# Patient Record
Sex: MALE | Race: WHITE | NOT HISPANIC OR LATINO | URBAN - METROPOLITAN AREA
[De-identification: names, ages, dates, MRNs, and addresses within clinical notes are randomized per-mention and may not be internally consistent; named-entity substitution may affect disease eponyms.]

---

## 2019-01-31 ENCOUNTER — OUTPATIENT (OUTPATIENT)
Dept: OUTPATIENT SERVICES | Facility: HOSPITAL | Age: 34
LOS: 1 days | Discharge: HOME | End: 2019-01-31

## 2019-01-31 DIAGNOSIS — E61.1 IRON DEFICIENCY: ICD-10-CM

## 2019-01-31 DIAGNOSIS — D50.0 IRON DEFICIENCY ANEMIA SECONDARY TO BLOOD LOSS (CHRONIC): ICD-10-CM

## 2019-01-31 DIAGNOSIS — D64.9 ANEMIA, UNSPECIFIED: ICD-10-CM

## 2019-01-31 DIAGNOSIS — E11.9 TYPE 2 DIABETES MELLITUS WITHOUT COMPLICATIONS: ICD-10-CM

## 2019-01-31 DIAGNOSIS — E78.00 PURE HYPERCHOLESTEROLEMIA, UNSPECIFIED: ICD-10-CM

## 2019-01-31 DIAGNOSIS — N18.2 CHRONIC KIDNEY DISEASE, STAGE 2 (MILD): ICD-10-CM

## 2019-01-31 DIAGNOSIS — E55.9 VITAMIN D DEFICIENCY, UNSPECIFIED: ICD-10-CM

## 2019-01-31 DIAGNOSIS — N39.0 URINARY TRACT INFECTION, SITE NOT SPECIFIED: ICD-10-CM

## 2019-01-31 DIAGNOSIS — K76.89 OTHER SPECIFIED DISEASES OF LIVER: ICD-10-CM

## 2019-02-27 ENCOUNTER — OUTPATIENT (OUTPATIENT)
Dept: OUTPATIENT SERVICES | Facility: HOSPITAL | Age: 34
LOS: 1 days | Discharge: HOME | End: 2019-02-27

## 2023-11-01 PROBLEM — Z00.00 ENCOUNTER FOR PREVENTIVE HEALTH EXAMINATION: Status: ACTIVE | Noted: 2023-11-01

## 2024-10-02 ENCOUNTER — NON-APPOINTMENT (OUTPATIENT)
Age: 39
End: 2024-10-02

## 2024-10-03 ENCOUNTER — INPATIENT (INPATIENT)
Facility: HOSPITAL | Age: 39
LOS: 5 days | Discharge: HOME | DRG: 392 | End: 2024-10-09
Attending: HOSPITALIST | Admitting: STUDENT IN AN ORGANIZED HEALTH CARE EDUCATION/TRAINING PROGRAM
Payer: COMMERCIAL

## 2024-10-03 VITALS
DIASTOLIC BLOOD PRESSURE: 88 MMHG | HEART RATE: 96 BPM | RESPIRATION RATE: 18 BRPM | OXYGEN SATURATION: 99 % | HEIGHT: 65 IN | WEIGHT: 160.06 LBS | SYSTOLIC BLOOD PRESSURE: 136 MMHG | TEMPERATURE: 98 F

## 2024-10-03 LAB
ALBUMIN SERPL ELPH-MCNC: 4.8 G/DL — SIGNIFICANT CHANGE UP (ref 3.5–5.2)
ALP SERPL-CCNC: 64 U/L — SIGNIFICANT CHANGE UP (ref 30–115)
ALT FLD-CCNC: 17 U/L — SIGNIFICANT CHANGE UP (ref 0–41)
ANION GAP SERPL CALC-SCNC: 13 MMOL/L — SIGNIFICANT CHANGE UP (ref 7–14)
APPEARANCE UR: CLEAR — SIGNIFICANT CHANGE UP
AST SERPL-CCNC: 16 U/L — SIGNIFICANT CHANGE UP (ref 0–41)
BACTERIA # UR AUTO: ABNORMAL /HPF
BASOPHILS # BLD AUTO: 0.02 K/UL — SIGNIFICANT CHANGE UP (ref 0–0.2)
BASOPHILS NFR BLD AUTO: 0.2 % — SIGNIFICANT CHANGE UP (ref 0–1)
BILIRUB SERPL-MCNC: 0.6 MG/DL — SIGNIFICANT CHANGE UP (ref 0.2–1.2)
BILIRUB UR-MCNC: NEGATIVE — SIGNIFICANT CHANGE UP
BUN SERPL-MCNC: 15 MG/DL — SIGNIFICANT CHANGE UP (ref 10–20)
CALCIUM SERPL-MCNC: 9.5 MG/DL — SIGNIFICANT CHANGE UP (ref 8.4–10.5)
CAST: SIGNIFICANT CHANGE UP /LPF
CHLORIDE SERPL-SCNC: 100 MMOL/L — SIGNIFICANT CHANGE UP (ref 98–110)
CO2 SERPL-SCNC: 24 MMOL/L — SIGNIFICANT CHANGE UP (ref 17–32)
COLOR SPEC: YELLOW — SIGNIFICANT CHANGE UP
CREAT SERPL-MCNC: 1 MG/DL — SIGNIFICANT CHANGE UP (ref 0.7–1.5)
DIFF PNL FLD: NEGATIVE — SIGNIFICANT CHANGE UP
EGFR: 98 ML/MIN/1.73M2 — SIGNIFICANT CHANGE UP
EOSINOPHIL # BLD AUTO: 0 K/UL — SIGNIFICANT CHANGE UP (ref 0–0.7)
EOSINOPHIL NFR BLD AUTO: 0 % — SIGNIFICANT CHANGE UP (ref 0–8)
GLUCOSE SERPL-MCNC: 122 MG/DL — HIGH (ref 70–99)
GLUCOSE UR QL: NEGATIVE MG/DL — SIGNIFICANT CHANGE UP
HCT VFR BLD CALC: 45 % — SIGNIFICANT CHANGE UP (ref 42–52)
HGB BLD-MCNC: 15.6 G/DL — SIGNIFICANT CHANGE UP (ref 14–18)
IMM GRANULOCYTES NFR BLD AUTO: 0.3 % — SIGNIFICANT CHANGE UP (ref 0.1–0.3)
KETONES UR-MCNC: 40 MG/DL
LACTATE SERPL-SCNC: 1.7 MMOL/L — SIGNIFICANT CHANGE UP (ref 0.7–2)
LEUKOCYTE ESTERASE UR-ACNC: NEGATIVE — SIGNIFICANT CHANGE UP
LIDOCAIN IGE QN: 21 U/L — SIGNIFICANT CHANGE UP (ref 7–60)
LYMPHOCYTES # BLD AUTO: 0.68 K/UL — LOW (ref 1.2–3.4)
LYMPHOCYTES # BLD AUTO: 6.9 % — LOW (ref 20.5–51.1)
MCHC RBC-ENTMCNC: 30.4 PG — SIGNIFICANT CHANGE UP (ref 27–31)
MCHC RBC-ENTMCNC: 34.7 G/DL — SIGNIFICANT CHANGE UP (ref 32–37)
MCV RBC AUTO: 87.5 FL — SIGNIFICANT CHANGE UP (ref 80–94)
MONOCYTES # BLD AUTO: 0.13 K/UL — SIGNIFICANT CHANGE UP (ref 0.1–0.6)
MONOCYTES NFR BLD AUTO: 1.3 % — LOW (ref 1.7–9.3)
NEUTROPHILS # BLD AUTO: 8.94 K/UL — HIGH (ref 1.4–6.5)
NEUTROPHILS NFR BLD AUTO: 91.3 % — HIGH (ref 42.2–75.2)
NITRITE UR-MCNC: NEGATIVE — SIGNIFICANT CHANGE UP
NRBC # BLD: 0 /100 WBCS — SIGNIFICANT CHANGE UP (ref 0–0)
PH UR: 6.5 — SIGNIFICANT CHANGE UP (ref 5–8)
PLATELET # BLD AUTO: 277 K/UL — SIGNIFICANT CHANGE UP (ref 130–400)
PMV BLD: 10.4 FL — SIGNIFICANT CHANGE UP (ref 7.4–10.4)
POTASSIUM SERPL-MCNC: 4.5 MMOL/L — SIGNIFICANT CHANGE UP (ref 3.5–5)
POTASSIUM SERPL-SCNC: 4.5 MMOL/L — SIGNIFICANT CHANGE UP (ref 3.5–5)
PROT SERPL-MCNC: 7.1 G/DL — SIGNIFICANT CHANGE UP (ref 6–8)
PROT UR-MCNC: 100 MG/DL
RBC # BLD: 5.14 M/UL — SIGNIFICANT CHANGE UP (ref 4.7–6.1)
RBC # FLD: 12.8 % — SIGNIFICANT CHANGE UP (ref 11.5–14.5)
RBC CASTS # UR COMP ASSIST: 1 /HPF — SIGNIFICANT CHANGE UP (ref 0–4)
SODIUM SERPL-SCNC: 137 MMOL/L — SIGNIFICANT CHANGE UP (ref 135–146)
SP GR SPEC: 1.03 — SIGNIFICANT CHANGE UP (ref 1–1.03)
SQUAMOUS # UR AUTO: >36 /HPF — HIGH (ref 0–5)
UROBILINOGEN FLD QL: 1 MG/DL — SIGNIFICANT CHANGE UP (ref 0.2–1)
WBC # BLD: 9.8 K/UL — SIGNIFICANT CHANGE UP (ref 4.8–10.8)
WBC # FLD AUTO: 9.8 K/UL — SIGNIFICANT CHANGE UP (ref 4.8–10.8)
WBC UR QL: 3 /HPF — SIGNIFICANT CHANGE UP (ref 0–5)

## 2024-10-03 PROCEDURE — 71045 X-RAY EXAM CHEST 1 VIEW: CPT

## 2024-10-03 PROCEDURE — 80053 COMPREHEN METABOLIC PANEL: CPT

## 2024-10-03 PROCEDURE — 80074 ACUTE HEPATITIS PANEL: CPT

## 2024-10-03 PROCEDURE — 87045 FECES CULTURE AEROBIC BACT: CPT

## 2024-10-03 PROCEDURE — 86850 RBC ANTIBODY SCREEN: CPT

## 2024-10-03 PROCEDURE — 86140 C-REACTIVE PROTEIN: CPT

## 2024-10-03 PROCEDURE — 84100 ASSAY OF PHOSPHORUS: CPT

## 2024-10-03 PROCEDURE — 85025 COMPLETE CBC W/AUTO DIFF WBC: CPT

## 2024-10-03 PROCEDURE — 80048 BASIC METABOLIC PNL TOTAL CA: CPT

## 2024-10-03 PROCEDURE — 74177 CT ABD & PELVIS W/CONTRAST: CPT | Mod: MC

## 2024-10-03 PROCEDURE — 99285 EMERGENCY DEPT VISIT HI MDM: CPT

## 2024-10-03 PROCEDURE — 74018 RADEX ABDOMEN 1 VIEW: CPT

## 2024-10-03 PROCEDURE — 86901 BLOOD TYPING SEROLOGIC RH(D): CPT

## 2024-10-03 PROCEDURE — 83605 ASSAY OF LACTIC ACID: CPT

## 2024-10-03 PROCEDURE — 87040 BLOOD CULTURE FOR BACTERIA: CPT

## 2024-10-03 PROCEDURE — 93005 ELECTROCARDIOGRAM TRACING: CPT

## 2024-10-03 PROCEDURE — 87177 OVA AND PARASITES SMEARS: CPT

## 2024-10-03 PROCEDURE — 76870 US EXAM SCROTUM: CPT | Mod: 26

## 2024-10-03 PROCEDURE — 85730 THROMBOPLASTIN TIME PARTIAL: CPT

## 2024-10-03 PROCEDURE — 86900 BLOOD TYPING SEROLOGIC ABO: CPT

## 2024-10-03 PROCEDURE — 85652 RBC SED RATE AUTOMATED: CPT

## 2024-10-03 PROCEDURE — 74177 CT ABD & PELVIS W/CONTRAST: CPT | Mod: 26,MC

## 2024-10-03 PROCEDURE — 74019 RADEX ABDOMEN 2 VIEWS: CPT

## 2024-10-03 PROCEDURE — 36415 COLL VENOUS BLD VENIPUNCTURE: CPT

## 2024-10-03 PROCEDURE — 83735 ASSAY OF MAGNESIUM: CPT

## 2024-10-03 PROCEDURE — 85610 PROTHROMBIN TIME: CPT

## 2024-10-03 PROCEDURE — 87046 STOOL CULTR AEROBIC BACT EA: CPT

## 2024-10-03 PROCEDURE — 84443 ASSAY THYROID STIM HORMONE: CPT

## 2024-10-03 RX ORDER — ONDANSETRON HCL/PF 4 MG/2 ML
4 VIAL (ML) INJECTION ONCE
Refills: 0 | Status: COMPLETED | OUTPATIENT
Start: 2024-10-03 | End: 2024-10-03

## 2024-10-03 RX ORDER — MORPHINE SULFATE 30 MG/1
4 TABLET, FILM COATED, EXTENDED RELEASE ORAL ONCE
Refills: 0 | Status: DISCONTINUED | OUTPATIENT
Start: 2024-10-03 | End: 2024-10-03

## 2024-10-03 RX ORDER — 5-HYDROXYTRYPTOPHAN (5-HTP) 100 MG
5 TABLET,DISINTEGRATING ORAL AT BEDTIME
Refills: 0 | Status: DISCONTINUED | OUTPATIENT
Start: 2024-10-03 | End: 2024-10-09

## 2024-10-03 RX ORDER — KETOROLAC TROMETHAMINE 10 MG/1
15 TABLET, FILM COATED ORAL ONCE
Refills: 0 | Status: DISCONTINUED | OUTPATIENT
Start: 2024-10-03 | End: 2024-10-03

## 2024-10-03 RX ORDER — SODIUM CHLORIDE IRRIG SOLUTION 0.9 %
1000 SOLUTION, IRRIGATION IRRIGATION ONCE
Refills: 0 | Status: COMPLETED | OUTPATIENT
Start: 2024-10-03 | End: 2024-10-03

## 2024-10-03 RX ORDER — ONDANSETRON HCL/PF 4 MG/2 ML
4 VIAL (ML) INJECTION THREE TIMES A DAY
Refills: 0 | Status: DISCONTINUED | OUTPATIENT
Start: 2024-10-03 | End: 2024-10-09

## 2024-10-03 RX ORDER — KETOROLAC TROMETHAMINE 10 MG/1
30 TABLET, FILM COATED ORAL ONCE
Refills: 0 | Status: DISCONTINUED | OUTPATIENT
Start: 2024-10-03 | End: 2024-10-03

## 2024-10-03 RX ORDER — KETOROLAC TROMETHAMINE 10 MG/1
30 TABLET, FILM COATED ORAL EVERY 6 HOURS
Refills: 0 | Status: DISCONTINUED | OUTPATIENT
Start: 2024-10-03 | End: 2024-10-04

## 2024-10-03 RX ORDER — ACETAMINOPHEN 325 MG
1000 TABLET ORAL ONCE
Refills: 0 | Status: COMPLETED | OUTPATIENT
Start: 2024-10-03 | End: 2024-10-03

## 2024-10-03 RX ORDER — HYDROMORPHONE HYDROCHLORIDE 1 MG/ML
0.5 INJECTION, SOLUTION INTRAMUSCULAR; INTRAVENOUS; SUBCUTANEOUS ONCE
Refills: 0 | Status: DISCONTINUED | OUTPATIENT
Start: 2024-10-03 | End: 2024-10-03

## 2024-10-03 RX ADMIN — KETOROLAC TROMETHAMINE 15 MILLIGRAM(S): 10 TABLET, FILM COATED ORAL at 22:24

## 2024-10-03 RX ADMIN — Medication 400 MILLIGRAM(S): at 22:35

## 2024-10-03 RX ADMIN — Medication 4 MILLIGRAM(S): at 19:00

## 2024-10-03 RX ADMIN — HYDROMORPHONE HYDROCHLORIDE 0.5 MILLIGRAM(S): 1 INJECTION, SOLUTION INTRAMUSCULAR; INTRAVENOUS; SUBCUTANEOUS at 23:58

## 2024-10-03 RX ADMIN — MORPHINE SULFATE 4 MILLIGRAM(S): 30 TABLET, FILM COATED, EXTENDED RELEASE ORAL at 19:01

## 2024-10-03 RX ADMIN — Medication 1000 MILLILITER(S): at 19:00

## 2024-10-03 RX ADMIN — KETOROLAC TROMETHAMINE 15 MILLIGRAM(S): 10 TABLET, FILM COATED ORAL at 21:39

## 2024-10-03 NOTE — H&P ADULT - ATTENDING COMMENTS
Pt has a pmhx of hypothyroidism and deafness with cochlear implants here because of abdominal pain severe in nature associated with vomiting. On exam pt states he has cramping pain. He had recent travel to [place] and did drink tap water, and ate the food there but no other changes. pt has no blooddy stool or blood vomitus. He has pain out of proportion    Vitals: HD stable, O2 stable  Gen: appropriate affect, no acute distress, appears uncomfortable  Neuro: no focal defects, no sensory deficits  HEENT: EOMI, no JVD, normocephalic, atraumatic, no scleral icterus  Cardio: RRR, S1 S2 present, no murmurs, rubs, or gallops  Resp: lungs b/l CTA, chest wall intact  Abd: soft, nondistended, nontender  MSK: no gross joint abnormalities, no obvious swelling  Skin: no rashes or wounds  Heme: no ecchymosis or petechiae present    ileus, obstipation vs gastroenteritis   -CT abdomen/pelvis with IV contrast: Focal segment of dilated distal small bowel loops within the lower pelvis with early fecalization. No discrete transition point. Nonspecific but   may reflect ileus or early obstruction  - Ultrasound testicles: No sonographic evidence of testicular torsion. Small left hydrocele with approximate volume of 2 mL  -surgery evaluated pt - less likely SBO and no surgical intervention  -start miralax bid, senna, lactulose tid  -pt pain severe, received several doses of dilaudid but will HOLD any opioids to prevent further constipation  -zofran prn for nausea  -also on robaxin  -stool cultures pending    hx of hypothyroidism  -TSH stable     DVT ppx - not indicated at this time  DC planning - if pt starts to have bowel movements, pt can be DCd, anticipate DC on 10/5 or 10/6 pt has no needs     HPI was written on 10/3. I saw and examined this pt with resident on 10/4 and this note reflects my care of patient on the day of examining patient.  I personally reviewed labs and imaging and ordered necessary testing/medications  I discussed care of the patient with licensed providers  I personally reviewed chart and consultant recommendations  Pt has complex medical issues that require extensive diagnosis and intervention / threat to life  I personally 40 spent minutes in care of patient.

## 2024-10-03 NOTE — H&P ADULT - NSHPPHYSICALEXAM_GEN_ALL_CORE
T(C): 36.8 (10-03-24 @ 18:04), Max: 36.8 (10-03-24 @ 18:04)  HR: 96 (10-03-24 @ 18:04) (96 - 96)  BP: 136/88 (10-03-24 @ 18:04) (136/88 - 136/88)  RR: 18 (10-03-24 @ 18:04) (18 - 18)  SpO2: 99% (10-03-24 @ 18:04) (99% - 99%)    CONSTITUTIONAL: Appears in distress and pacing around a bit  EYES: PERRLA and symmetric, EOMI, No conjunctival or scleral injection, non-icteric  ENMT: Oral mucosa with moist membranes. Normal dentition; no pharyngeal injection or exudates             NECK: Supple, symmetric and without tracheal deviation   RESP: No respiratory distress, no use of accessory muscles; CTA b/l, no WRR  CV: RRR, +S1S2, no MRG; no JVD; no peripheral edema  GI: mildly tender abdomen upon palpation. No guarding or rebound tenderness  LYMPH: No cervical LAD or tenderness; no axillary LAD or tenderness; no inguinal LAD or tenderness  MSK: Normal gait; No digital clubbing or cyanosis; examination of the (head/neck/spine/ribs/pelvis, RUE, LUE, RLE, LLE) without misalignment,            Normal ROM without pain, no spinal tenderness, normal muscle strength/tone  SKIN: No rashes or ulcers noted; no subcutaneous nodules or induration palpable  NEURO: CN II-XII intact; normal reflexes in upper and lower extremities, sensation intact in upper and lower extremities b/l to light touch   PSYCH: Appropriate insight/judgment; A+O x 3, mood and affect appropriate, recent/remote memory intact

## 2024-10-03 NOTE — ED PROVIDER NOTE - PHYSICAL EXAMINATION
CONSTITUTIONAL: Well-developed; well-nourished; in no acute distress, nontoxic appearing  SKIN: skin exam is warm and dry  ENT: MMM   NECK: ROM intact.  CARD: S1, S2 normal, no murmur  RESP: No wheezes, rales or rhonchi. Good air movement bilaterally  ABD: soft; non-distended; +RLQ/LLQ TTP, no rebound. no cvat  : cremasteric reflex intact bilaterally, non-tender, no skin  changes Performed by: Dr Plascencia. Chaperone: myself  EXT: Normal ROM.    NEURO: awake, alert, following commands, oriented, grossly unremarkable. No Focal deficits. GCS 15.   PSYCH: Cooperative

## 2024-10-03 NOTE — CONSULT NOTE ADULT - ASSESSMENT
ASSESSMENT:  39 year old male with past medical history significant for hypothyroidism who presents to the ED with complaints of acute onset abdominal pain starting at 9am this morning. Patient states that he has a shot of vodka with a teaspoon of black pepper because he thought this would improve his symptoms, after which he started vomiting (non bloody, non bilious). He denies any fever at home. No history of inflammatory bowel disease, denies ever having a colonoscopy. He also tried Tums, GasX, and Pepto Bismol with no improvement.     General surgery consulted given CT findings concerning for possible early obstruction vs. ileus. Physical exam findings, imaging, and labs as documented above.     PLAN:  -No clinical signs or concern for obstruction, no acute surgical intervention  -Can consider GI consult  -Dispo per ED    Above plan discussed with Attending Surgeon Dr. Lopez, patient, patient family, and ED  10-03-24 @ 22:10

## 2024-10-03 NOTE — ED PROVIDER NOTE - CLINICAL SUMMARY MEDICAL DECISION MAKING FREE TEXT BOX
Patient presented with worsening severe abdominal pain as documented. (+) tender on exam but otherwise afebrile HD stable. Obtained labs which were grossly unremarkable including no significant leukocytosis, anemia, signs of dehydration/SIMIN, transaminitis or significant electrolyte abnormalities. UA negative for infection. Testicular US negative. CT abd/pelvis however showed (+) possible ileus vs obstruction. Surgery was consulted and evaluated the patient in the ED - no surgical intervention at this time. Patient however with intractable abdominal pain, not tolerating PO. Will require admission for further management with surgery following. HD stable at time of admission.

## 2024-10-03 NOTE — ED PROVIDER NOTE - OBJECTIVE STATEMENT
39 year old male, past medical history [place] cochlear implant, who presents with abd pain. patient with periumbilical abd pain that began earlier today with nausea/vomiting that has been persistent since. patient reports normal BM that occurred after pain began. denies f/c, chest pain, shortness of breath, urinary symptoms. no hx abd surgeries. no hx similar.

## 2024-10-03 NOTE — ED PROVIDER NOTE - DIFFERENTIAL DIAGNOSIS
Differential Diagnosis Abdominal pain r/o UTI/pyelo, kidney stone, obstruction, perforation, pancreatitis, abscess, appendicitis, ischemia, hepatobiliary abnormality or any other emergent intra-abdominal pathology.    Also r/o testicular etiology

## 2024-10-03 NOTE — H&P ADULT - NSHPLABSRESULTS_GEN_ALL_CORE
15.6   9.80  )-----------( 277      ( 03 Oct 2024 19:20 )             45.0       10-03    137  |  100  |  15  ----------------------------<  122[H]  4.5   |  24  |  1.0    Ca    9.5      03 Oct 2024 19:20    TPro  7.1  /  Alb  4.8  /  TBili  0.6  /  DBili  x   /  AST  16  /  ALT  17  /  AlkPhos  64  10-03              Urinalysis Basic - ( 03 Oct 2024 19:20 )    Color: x / Appearance: x / SG: x / pH: x  Gluc: 122 mg/dL / Ketone: x  / Bili: x / Urobili: x   Blood: x / Protein: x / Nitrite: x   Leuk Esterase: x / RBC: x / WBC x   Sq Epi: x / Non Sq Epi: x / Bacteria: x            Lactate Trend  10-03 @ 19:20 Lactate:1.7             CAPILLARY BLOOD GLUCOSE                  RADIOLOGY & ADDITIONAL TESTS:    Imaging Personally Reviewed:  [ ] YES     EKG personally reviewed [ ] YES, interpretation:     Telemetry reviewed:

## 2024-10-03 NOTE — ED ADULT TRIAGE NOTE - CHIEF COMPLAINT QUOTE
Patient c/o generalized abdominal pain that began at 9am. Patient reports worsening pain throughout the day. Patient c/o nausea & vomiting

## 2024-10-03 NOTE — H&P ADULT - NSHPREVIEWOFSYSTEMS_GEN_ALL_CORE
REVIEW OF SYSTEMS:  CONSTITUTIONAL: No fever, chills, night sweats, or fatigue  EYES: No eye pain, visual disturbances, or discharge  ENMT:  No difficulty hearing, tinnitus, vertigo; No sinus or throat pain  NECK: No pain or stiffness  BREASTS: No pain, masses, or nipple discharge  RESPIRATORY: No cough, wheezing, or hemoptysis; No shortness of breath  CARDIOVASCULAR: No chest pain, palpitations, dizziness, or leg swelling  GASTROINTESTINAL: sever diffuse abdominal pain and nausea/vomiting  GENITOURINARY: No dysuria, frequency, hematuria, or incontinence  NEUROLOGICAL: No headaches, memory loss, loss of strength, numbness, or tremors  SKIN: No itching, burning, rashes, or lesions   LYMPH NODES: No enlarged glands  ENDOCRINE: No heat or cold intolerance; No hair loss  MUSCULOSKELETAL: No joint pain or swelling; No muscle, back, or extremity pain  PSYCHIATRIC: No depression, anxiety, mood swings, or difficulty sleeping  HEME/LYMPH: No easy bruising, or bleeding gums  ALLERY AND IMMUNOLOGIC: No hives or eczema

## 2024-10-03 NOTE — H&P ADULT - ASSESSMENT
39 year old man with a past medical history of hypothyroidism and deafness with cochlear implants presented for severe diffuse abdominal pain of a few hours duration.      #Severe diffuse abdominal pain. No guarding or rebound tenderness  #Possible Ileus based on CT  #Possible Gastroenteritis   #r/o parasitic infection-unlikely  - . Patient reported having diffuse abdominal pain, non radiating unbearable pain, that caused him to vomit a non-bilious non-bloody vomitus three times before presenting to the ED. on PE mildly tender abdomen upon palpation. No guarding or rebound tenderness.  - Vitals WNL.   - Labs WNL  - CT abdomen/pelvis with IV contrast: Focal segment of dilated distal small bowel loops within the lower pelvis with early fecalization. No discrete transition point. Nonspecific but   may reflect ileus or early obstruction  - Ultrasound testicles: No sonographic evidence of testicular torsion. Small left hydrocele with approximate volume of 2 mL  - Surgery: No surgical intervention  - Will start on IV ketorolac PRN for pain  - Methocarbamol 750 standing q 8  - Ondansetron IV q8 for nausea  - Will order stool culture for bacteria/ova and parasite given occupational and travel history.     #Hypothyroidism  - On levothyroxine 125 mcg  - Patient last measured his TSH 2 years ago. f/u am TSH    #Full code

## 2024-10-03 NOTE — ED PROVIDER NOTE - CONSIDERATION OF ADMISSION OBSERVATION
Patient with intractable abdominal pain, inability to tolerate PO, will require admission Consideration of Admission/Observation

## 2024-10-03 NOTE — H&P ADULT - HISTORY OF PRESENT ILLNESS
39 year old man with a past medical history of hypothyroidism and deafness with cochlear implants presented for severe diffuse abdominal pain of a few hours duration. Patient reported having diffuse abdominal pain, non radiating unbearable pain, that caused him to vomit a non-bilious non-bloody vomitus three times before presenting to the ED. Patient reported that the pain is unrelated to food intake. He has not had such pain before. He denied diarrhea, blood in stool, fever, chills, changes in urinary habits, sob, chest pain, headache, rashes or palpitations. 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 Patient traveled in August to [place] where reported drinking from tap water, eating meat and walked barefoot on the beach. He did not eat anything from outside yesterday besides a cake. He does not smoke, drink alcohol or uses drugs    in the ED:  39 year old man with a past medical history of hypothyroidism and deafness with cochlear implants presented for severe diffuse abdominal pain of a few hours duration. Patient reported having diffuse abdominal pain, non radiating unbearable pain, that caused him to vomit a non-bilious non-bloody vomitus three times before presenting to the ED. Patient reported that the pain is unrelated to food intake. He has not had such pain before. He denied diarrhea, blood in stool, fever, chills, changes in urinary habits, sob, chest pain, headache, rashes or palpitations. 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 Patient traveled in August to [place] where reported drinking from tap water, eating meat and walked barefoot on the beach. He did not eat anything from outside yesterday besides a cake. He does not smoke, drink alcohol or uses drugs    in the ED: · BP Systolic	136 mm Hg BP Diastolic	88 mm Hg  Heart Rate	96 /min Respiration Rate (breaths/min)	18 /min Temp (C)	36.8 Degrees C    CT abdomen/pelvis with IV contrast: Focal segment of dilated distal small bowel loops within the lower pelvis with early fecalization. No discrete transition point. Nonspecific but   may reflect ileus or early obstruction    Ultrasound testicles: No sonographic evidence of testicular torsion. Small left hydrocele with approximate volume of 2 mL

## 2024-10-04 LAB
ALBUMIN SERPL ELPH-MCNC: 4 G/DL — SIGNIFICANT CHANGE UP (ref 3.5–5.2)
ALP SERPL-CCNC: 52 U/L — SIGNIFICANT CHANGE UP (ref 30–115)
ALT FLD-CCNC: 13 U/L — SIGNIFICANT CHANGE UP (ref 0–41)
ANION GAP SERPL CALC-SCNC: 11 MMOL/L — SIGNIFICANT CHANGE UP (ref 7–14)
AST SERPL-CCNC: 12 U/L — SIGNIFICANT CHANGE UP (ref 0–41)
BASOPHILS # BLD AUTO: 0.02 K/UL — SIGNIFICANT CHANGE UP (ref 0–0.2)
BASOPHILS NFR BLD AUTO: 0.2 % — SIGNIFICANT CHANGE UP (ref 0–1)
BILIRUB SERPL-MCNC: 0.8 MG/DL — SIGNIFICANT CHANGE UP (ref 0.2–1.2)
BUN SERPL-MCNC: 11 MG/DL — SIGNIFICANT CHANGE UP (ref 10–20)
CALCIUM SERPL-MCNC: 9 MG/DL — SIGNIFICANT CHANGE UP (ref 8.4–10.4)
CHLORIDE SERPL-SCNC: 104 MMOL/L — SIGNIFICANT CHANGE UP (ref 98–110)
CO2 SERPL-SCNC: 26 MMOL/L — SIGNIFICANT CHANGE UP (ref 17–32)
CREAT SERPL-MCNC: 1 MG/DL — SIGNIFICANT CHANGE UP (ref 0.7–1.5)
EGFR: 98 ML/MIN/1.73M2 — SIGNIFICANT CHANGE UP
EOSINOPHIL # BLD AUTO: 0.08 K/UL — SIGNIFICANT CHANGE UP (ref 0–0.7)
EOSINOPHIL NFR BLD AUTO: 0.8 % — SIGNIFICANT CHANGE UP (ref 0–8)
GLUCOSE SERPL-MCNC: 95 MG/DL — SIGNIFICANT CHANGE UP (ref 70–99)
HCT VFR BLD CALC: 44 % — SIGNIFICANT CHANGE UP (ref 42–52)
HGB BLD-MCNC: 15.1 G/DL — SIGNIFICANT CHANGE UP (ref 14–18)
IMM GRANULOCYTES NFR BLD AUTO: 0.2 % — SIGNIFICANT CHANGE UP (ref 0.1–0.3)
LACTATE SERPL-SCNC: 1 MMOL/L — SIGNIFICANT CHANGE UP (ref 0.7–2)
LYMPHOCYTES # BLD AUTO: 2.32 K/UL — SIGNIFICANT CHANGE UP (ref 1.2–3.4)
LYMPHOCYTES # BLD AUTO: 23.2 % — SIGNIFICANT CHANGE UP (ref 20.5–51.1)
MAGNESIUM SERPL-MCNC: 1.9 MG/DL — SIGNIFICANT CHANGE UP (ref 1.8–2.4)
MCHC RBC-ENTMCNC: 30.5 PG — SIGNIFICANT CHANGE UP (ref 27–31)
MCHC RBC-ENTMCNC: 34.3 G/DL — SIGNIFICANT CHANGE UP (ref 32–37)
MCV RBC AUTO: 88.9 FL — SIGNIFICANT CHANGE UP (ref 80–94)
MONOCYTES # BLD AUTO: 0.93 K/UL — HIGH (ref 0.1–0.6)
MONOCYTES NFR BLD AUTO: 9.3 % — SIGNIFICANT CHANGE UP (ref 1.7–9.3)
NEUTROPHILS # BLD AUTO: 6.61 K/UL — HIGH (ref 1.4–6.5)
NEUTROPHILS NFR BLD AUTO: 66.3 % — SIGNIFICANT CHANGE UP (ref 42.2–75.2)
NRBC # BLD: 0 /100 WBCS — SIGNIFICANT CHANGE UP (ref 0–0)
PLATELET # BLD AUTO: 243 K/UL — SIGNIFICANT CHANGE UP (ref 130–400)
PMV BLD: 10.5 FL — HIGH (ref 7.4–10.4)
POTASSIUM SERPL-MCNC: 3.9 MMOL/L — SIGNIFICANT CHANGE UP (ref 3.5–5)
POTASSIUM SERPL-SCNC: 3.9 MMOL/L — SIGNIFICANT CHANGE UP (ref 3.5–5)
PROT SERPL-MCNC: 6 G/DL — SIGNIFICANT CHANGE UP (ref 6–8)
RBC # BLD: 4.95 M/UL — SIGNIFICANT CHANGE UP (ref 4.7–6.1)
RBC # FLD: 13.2 % — SIGNIFICANT CHANGE UP (ref 11.5–14.5)
SODIUM SERPL-SCNC: 141 MMOL/L — SIGNIFICANT CHANGE UP (ref 135–146)
TSH SERPL-MCNC: 1.65 UIU/ML — SIGNIFICANT CHANGE UP (ref 0.27–4.2)
WBC # BLD: 9.98 K/UL — SIGNIFICANT CHANGE UP (ref 4.8–10.8)
WBC # FLD AUTO: 9.98 K/UL — SIGNIFICANT CHANGE UP (ref 4.8–10.8)

## 2024-10-04 PROCEDURE — 93010 ELECTROCARDIOGRAM REPORT: CPT

## 2024-10-04 PROCEDURE — 99221 1ST HOSP IP/OBS SF/LOW 40: CPT

## 2024-10-04 PROCEDURE — 74018 RADEX ABDOMEN 1 VIEW: CPT | Mod: 26,59

## 2024-10-04 PROCEDURE — 71045 X-RAY EXAM CHEST 1 VIEW: CPT | Mod: 26

## 2024-10-04 PROCEDURE — 74019 RADEX ABDOMEN 2 VIEWS: CPT | Mod: 26,77

## 2024-10-04 PROCEDURE — 74019 RADEX ABDOMEN 2 VIEWS: CPT | Mod: 26

## 2024-10-04 RX ORDER — INFLUENZA VIRUS VACCINE 15; 15; 15; 15 UG/.5ML; UG/.5ML; UG/.5ML; UG/.5ML
0.5 SUSPENSION INTRAMUSCULAR ONCE
Refills: 0 | Status: DISCONTINUED | OUTPATIENT
Start: 2024-10-04 | End: 2024-10-09

## 2024-10-04 RX ORDER — KETOROLAC TROMETHAMINE 10 MG/1
30 TABLET, FILM COATED ORAL ONCE
Refills: 0 | Status: DISCONTINUED | OUTPATIENT
Start: 2024-10-04 | End: 2024-10-04

## 2024-10-04 RX ORDER — SALINE LAXATIVE 7; 19 G/118ML; G/118ML
1 ENEMA RECTAL ONCE
Refills: 0 | Status: DISCONTINUED | OUTPATIENT
Start: 2024-10-04 | End: 2024-10-04

## 2024-10-04 RX ORDER — HYDROMORPHONE HYDROCHLORIDE 1 MG/ML
0.5 INJECTION, SOLUTION INTRAMUSCULAR; INTRAVENOUS; SUBCUTANEOUS ONCE
Refills: 0 | Status: DISCONTINUED | OUTPATIENT
Start: 2024-10-04 | End: 2024-10-04

## 2024-10-04 RX ORDER — SENNOSIDES 8.6 MG
2 TABLET ORAL AT BEDTIME
Refills: 0 | Status: DISCONTINUED | OUTPATIENT
Start: 2024-10-04 | End: 2024-10-08

## 2024-10-04 RX ORDER — LACTULOSE 10 G/15ML
10 SOLUTION ORAL; RECTAL EVERY 8 HOURS
Refills: 0 | Status: DISCONTINUED | OUTPATIENT
Start: 2024-10-04 | End: 2024-10-04

## 2024-10-04 RX ORDER — SODIUM CHLORIDE IRRIG SOLUTION 0.9 %
500 SOLUTION, IRRIGATION IRRIGATION
Refills: 0 | Status: DISCONTINUED | OUTPATIENT
Start: 2024-10-04 | End: 2024-10-05

## 2024-10-04 RX ORDER — LACTULOSE 10 G/15ML
10 SOLUTION ORAL; RECTAL
Refills: 0 | Status: COMPLETED | OUTPATIENT
Start: 2024-10-04 | End: 2024-10-04

## 2024-10-04 RX ORDER — SALINE LAXATIVE 7; 19 G/118ML; G/118ML
1 ENEMA RECTAL ONCE
Refills: 0 | Status: COMPLETED | OUTPATIENT
Start: 2024-10-04 | End: 2024-10-04

## 2024-10-04 RX ADMIN — Medication 17 GRAM(S): at 10:37

## 2024-10-04 RX ADMIN — Medication 1000 MILLIGRAM(S): at 00:03

## 2024-10-04 RX ADMIN — LACTULOSE 10 GRAM(S): 10 SOLUTION ORAL; RECTAL at 21:21

## 2024-10-04 RX ADMIN — LACTULOSE 10 GRAM(S): 10 SOLUTION ORAL; RECTAL at 20:32

## 2024-10-04 RX ADMIN — Medication 750 MILLIGRAM(S): at 14:14

## 2024-10-04 RX ADMIN — KETOROLAC TROMETHAMINE 30 MILLIGRAM(S): 10 TABLET, FILM COATED ORAL at 19:33

## 2024-10-04 RX ADMIN — Medication 60 MILLILITER(S): at 20:35

## 2024-10-04 RX ADMIN — HYDROMORPHONE HYDROCHLORIDE 0.5 MILLIGRAM(S): 1 INJECTION, SOLUTION INTRAMUSCULAR; INTRAVENOUS; SUBCUTANEOUS at 14:14

## 2024-10-04 RX ADMIN — LACTULOSE 10 GRAM(S): 10 SOLUTION ORAL; RECTAL at 15:29

## 2024-10-04 RX ADMIN — HYDROMORPHONE HYDROCHLORIDE 0.5 MILLIGRAM(S): 1 INJECTION, SOLUTION INTRAMUSCULAR; INTRAVENOUS; SUBCUTANEOUS at 09:33

## 2024-10-04 RX ADMIN — Medication 2 TABLET(S): at 21:22

## 2024-10-04 RX ADMIN — LACTULOSE 10 GRAM(S): 10 SOLUTION ORAL; RECTAL at 19:33

## 2024-10-04 RX ADMIN — SALINE LAXATIVE 1 ENEMA: 7; 19 ENEMA RECTAL at 17:06

## 2024-10-04 RX ADMIN — Medication 750 MILLIGRAM(S): at 21:23

## 2024-10-04 RX ADMIN — KETOROLAC TROMETHAMINE 30 MILLIGRAM(S): 10 TABLET, FILM COATED ORAL at 20:00

## 2024-10-04 RX ADMIN — KETOROLAC TROMETHAMINE 30 MILLIGRAM(S): 10 TABLET, FILM COATED ORAL at 09:02

## 2024-10-04 RX ADMIN — KETOROLAC TROMETHAMINE 30 MILLIGRAM(S): 10 TABLET, FILM COATED ORAL at 09:17

## 2024-10-04 RX ADMIN — Medication 5 MILLIGRAM(S): at 21:23

## 2024-10-04 NOTE — PROGRESS NOTE ADULT - SUBJECTIVE AND OBJECTIVE BOX
HITESH WAYNE 39y Male  MRN#: [id number]     Hospital Day: 1d    Pt is currently admitted with the primary diagnosis of  Abdominal pain        SUBJECTIVE     Overnight events  None    Subjective complaints  Pt was evaluated this am. Patient denied any active complaints and per patient his symptoms are not improving. Sudden severe abdominal pain. urgent abdominal xray was place. seems like constipation                                            ----------------------------------------------------------  OBJECTIVE  PAST MEDICAL & SURGICAL HISTORY                                            -----------------------------------------------------------  ALLERGIES:  No Known Allergies                                            ------------------------------------------------------------    HOME MEDICATIONS  Home Medications:  levothyroxine 125 mcg (0.125 mg) oral capsule: 1 cap(s) orally once a day (03 Oct 2024 23:43)                           MEDICATIONS:  STANDING MEDICATIONS  levothyroxine 125 MICROGram(s) Oral daily  melatonin 5 milliGRAM(s) Oral at bedtime  methocarbamol 750 milliGRAM(s) Oral three times a day  polyethylene glycol 3350 17 Gram(s) Oral two times a day  senna 2 Tablet(s) Oral at bedtime    PRN MEDICATIONS  ondansetron Injectable 4 milliGRAM(s) IV Push three times a day PRN                                            ------------------------------------------------------------  VITAL SIGNS: Last 24 Hours  T(C): 36.4 (04 Oct 2024 09:21), Max: 36.8 (03 Oct 2024 18:04)  T(F): 97.5 (04 Oct 2024 09:21), Max: 98.3 (04 Oct 2024 07:23)  HR: 64 (04 Oct 2024 09:21) (60 - 96)  BP: 132/74 (04 Oct 2024 09:21) (132/74 - 136/88)  BP(mean): --  RR: 20 (04 Oct 2024 09:21) (18 - 20)  SpO2: 100% (04 Oct 2024 09:21) (98% - 100%)                                             --------------------------------------------------------------  LABS:                        15.1   9.98  )-----------( 243      ( 04 Oct 2024 05:36 )             44.0     10-04    141  |  104  |  11  ----------------------------<  95  3.9   |  26  |  1.0    Ca    9.0      04 Oct 2024 05:36  Mg     1.9     10-04    TPro  6.0  /  Alb  4.0  /  TBili  0.8  /  DBili  x   /  AST  12  /  ALT  13  /  AlkPhos  52  10-04      Urinalysis Basic - ( 04 Oct 2024 05:36 )    Color: x / Appearance: x / SG: x / pH: x  Gluc: 95 mg/dL / Ketone: x  / Bili: x / Urobili: x   Blood: x / Protein: x / Nitrite: x   Leuk Esterase: x / RBC: x / WBC x   Sq Epi: x / Non Sq Epi: x / Bacteria: x        Lactate, Blood: 1.0 mmol/L (10-04-24 @ 05:36)  Lactate, Blood: 1.7 mmol/L (10-03-24 @ 19:20)        Urinalysis with Rflx Culture (collected 03 Oct 2024 19:08)                                                    -------------------------------------------------------------  RADIOLOGY:                                            --------------------------------------------------------------    PHYSICAL EXAM:  GENERAL: NAD, lying in bed comfortably  HEAD:  Atraumatic, Normocephalic  EYES: EOMI, conjunctiva and sclera clear  ENT: Moist mucous membranes  NECK: Supple, No JVD  CHEST/LUNG: Clear to auscultation bilaterally; No rales, rhonchi, wheezing, or rubs. Unlabored respirations  HEART: regular rate and rhythm; No murmurs, rubs, or gallops  ABDOMEN: Bowel sounds hypoactive; Soft, +tender, Nondistended.    EXTREMITIES: Warm. No clubbing, cyanosis, or edema  NERVOUS SYSTEM:  Alert & Oriented X3. No focal deficits   SKIN: No rashes or lesions                                           --------------------------------------------------------------

## 2024-10-04 NOTE — PROGRESS NOTE ADULT - ASSESSMENT
39 year old man with a past medical history of hypothyroidism and deafness with cochlear implants presented for severe diffuse abdominal pain of a few hours duration.    #Severe diffuse abdominal pain. No guarding or rebound tenderness  #Possible Ileus based on CT  #Possible Gastroenteritis   #r/o parasitic infection-unlikely  - . Patient reported having diffuse abdominal pain, non radiating unbearable pain, that caused him to vomit a non-bilious non-bloody vomitus three times before presenting to the ED. on PE mildly tender abdomen upon palpation. No guarding or rebound tenderness.  - Vitals WNL.   - Labs WNL  - CT abdomen/pelvis with IV contrast: Focal segment of dilated distal small bowel loops within the lower pelvis with early fecalization. No discrete transition point. Nonspecific but   may reflect ileus or early obstruction  - Ultrasound testicles: No sonographic evidence of testicular torsion. Small left hydrocele with approximate volume of 2 mL  - Surgery: No surgical intervention  - Will start on IV ketorolac PRN for pain  - Methocarbamol 750 standing q 8  - Ondansetron IV q8 for nausea  - Will order stool culture for bacteria/ova and parasite given occupational and travel history.   - Abdominal Xray done 10/4 AM, patient was complaining of new sudden severe abdominal pain  - Waiting for official read, Xray doesn't seem very concerning for perforation, seems like constipation  - Started pt on bowel regiment  - Given 0.5mg IV Dilaudid for severe pain     #Hypothyroidism  - On levothyroxine 125 mcg  - Patient last measured his TSH 2 years ago. f/u am TSH    #Full code   DVT PPx - mobilization  GI PPx - none currently  Diet - NPO  Activity - AAT  Dispo - medicine floor, symptom monitoring

## 2024-10-05 LAB
ANION GAP SERPL CALC-SCNC: 15 MMOL/L — HIGH (ref 7–14)
BASOPHILS # BLD AUTO: 0.03 K/UL — SIGNIFICANT CHANGE UP (ref 0–0.2)
BASOPHILS NFR BLD AUTO: 0.2 % — SIGNIFICANT CHANGE UP (ref 0–1)
BUN SERPL-MCNC: 15 MG/DL — SIGNIFICANT CHANGE UP (ref 10–20)
CALCIUM SERPL-MCNC: 9.6 MG/DL — SIGNIFICANT CHANGE UP (ref 8.4–10.5)
CHLORIDE SERPL-SCNC: 100 MMOL/L — SIGNIFICANT CHANGE UP (ref 98–110)
CO2 SERPL-SCNC: 24 MMOL/L — SIGNIFICANT CHANGE UP (ref 17–32)
CREAT SERPL-MCNC: 1.1 MG/DL — SIGNIFICANT CHANGE UP (ref 0.7–1.5)
EGFR: 87 ML/MIN/1.73M2 — SIGNIFICANT CHANGE UP
EOSINOPHIL # BLD AUTO: 0.01 K/UL — SIGNIFICANT CHANGE UP (ref 0–0.7)
EOSINOPHIL NFR BLD AUTO: 0.1 % — SIGNIFICANT CHANGE UP (ref 0–8)
GLUCOSE SERPL-MCNC: 105 MG/DL — HIGH (ref 70–99)
HCT VFR BLD CALC: 47 % — SIGNIFICANT CHANGE UP (ref 42–52)
HGB BLD-MCNC: 16.1 G/DL — SIGNIFICANT CHANGE UP (ref 14–18)
IMM GRANULOCYTES NFR BLD AUTO: 0.4 % — HIGH (ref 0.1–0.3)
LYMPHOCYTES # BLD AUTO: 0.88 K/UL — LOW (ref 1.2–3.4)
LYMPHOCYTES # BLD AUTO: 5.9 % — LOW (ref 20.5–51.1)
MAGNESIUM SERPL-MCNC: 1.9 MG/DL — SIGNIFICANT CHANGE UP (ref 1.8–2.4)
MCHC RBC-ENTMCNC: 30.3 PG — SIGNIFICANT CHANGE UP (ref 27–31)
MCHC RBC-ENTMCNC: 34.3 G/DL — SIGNIFICANT CHANGE UP (ref 32–37)
MCV RBC AUTO: 88.3 FL — SIGNIFICANT CHANGE UP (ref 80–94)
MONOCYTES # BLD AUTO: 1.14 K/UL — HIGH (ref 0.1–0.6)
MONOCYTES NFR BLD AUTO: 7.7 % — SIGNIFICANT CHANGE UP (ref 1.7–9.3)
NEUTROPHILS # BLD AUTO: 12.78 K/UL — HIGH (ref 1.4–6.5)
NEUTROPHILS NFR BLD AUTO: 85.7 % — HIGH (ref 42.2–75.2)
NRBC # BLD: 0 /100 WBCS — SIGNIFICANT CHANGE UP (ref 0–0)
PHOSPHATE SERPL-MCNC: 2.9 MG/DL — SIGNIFICANT CHANGE UP (ref 2.1–4.9)
PLATELET # BLD AUTO: 294 K/UL — SIGNIFICANT CHANGE UP (ref 130–400)
PMV BLD: 10.3 FL — SIGNIFICANT CHANGE UP (ref 7.4–10.4)
POTASSIUM SERPL-MCNC: 3.8 MMOL/L — SIGNIFICANT CHANGE UP (ref 3.5–5)
POTASSIUM SERPL-SCNC: 3.8 MMOL/L — SIGNIFICANT CHANGE UP (ref 3.5–5)
RBC # BLD: 5.32 M/UL — SIGNIFICANT CHANGE UP (ref 4.7–6.1)
RBC # FLD: 13.1 % — SIGNIFICANT CHANGE UP (ref 11.5–14.5)
SODIUM SERPL-SCNC: 139 MMOL/L — SIGNIFICANT CHANGE UP (ref 135–146)
WBC # BLD: 14.9 K/UL — HIGH (ref 4.8–10.8)
WBC # FLD AUTO: 14.9 K/UL — HIGH (ref 4.8–10.8)

## 2024-10-05 PROCEDURE — 74018 RADEX ABDOMEN 1 VIEW: CPT | Mod: 26

## 2024-10-05 PROCEDURE — 99232 SBSQ HOSP IP/OBS MODERATE 35: CPT | Mod: GC

## 2024-10-05 RX ORDER — KETOROLAC TROMETHAMINE 10 MG/1
15 TABLET, FILM COATED ORAL ONCE
Refills: 0 | Status: DISCONTINUED | OUTPATIENT
Start: 2024-10-05 | End: 2024-10-05

## 2024-10-05 RX ORDER — PANTOPRAZOLE SODIUM 40 MG/1
40 TABLET, DELAYED RELEASE ORAL DAILY
Refills: 0 | Status: DISCONTINUED | OUTPATIENT
Start: 2024-10-05 | End: 2024-10-09

## 2024-10-05 RX ORDER — SALINE LAXATIVE 7; 19 G/118ML; G/118ML
1 ENEMA RECTAL ONCE
Refills: 0 | Status: COMPLETED | OUTPATIENT
Start: 2024-10-05 | End: 2024-10-05

## 2024-10-05 RX ORDER — SODIUM CHLORIDE IRRIG SOLUTION 0.9 %
1000 SOLUTION, IRRIGATION IRRIGATION
Refills: 0 | Status: DISCONTINUED | OUTPATIENT
Start: 2024-10-05 | End: 2024-10-09

## 2024-10-05 RX ORDER — LACTULOSE 10 G/15ML
10 SOLUTION ORAL; RECTAL
Refills: 0 | Status: DISCONTINUED | OUTPATIENT
Start: 2024-10-05 | End: 2024-10-05

## 2024-10-05 RX ORDER — POLYVINYL ALCOHOL 1 %
1 DROPS OPHTHALMIC (EYE) ONCE
Refills: 0 | Status: COMPLETED | OUTPATIENT
Start: 2024-10-05 | End: 2024-10-05

## 2024-10-05 RX ORDER — KETOROLAC TROMETHAMINE 10 MG/1
15 TABLET, FILM COATED ORAL EVERY 6 HOURS
Refills: 0 | Status: DISCONTINUED | OUTPATIENT
Start: 2024-10-05 | End: 2024-10-09

## 2024-10-05 RX ORDER — DICYCLOMINE HCL 20 MG
20 TABLET ORAL
Refills: 0 | Status: DISCONTINUED | OUTPATIENT
Start: 2024-10-05 | End: 2024-10-05

## 2024-10-05 RX ORDER — BISACODYL 5 MG/1
10 TABLET, COATED ORAL ONCE
Refills: 0 | Status: COMPLETED | OUTPATIENT
Start: 2024-10-05 | End: 2024-10-05

## 2024-10-05 RX ORDER — BISACODYL 5 MG/1
10 TABLET, COATED ORAL DAILY
Refills: 0 | Status: DISCONTINUED | OUTPATIENT
Start: 2024-10-06 | End: 2024-10-09

## 2024-10-05 RX ADMIN — SALINE LAXATIVE 1 ENEMA: 7; 19 ENEMA RECTAL at 18:32

## 2024-10-05 RX ADMIN — Medication 2 TABLET(S): at 22:31

## 2024-10-05 RX ADMIN — LACTULOSE 10 GRAM(S): 10 SOLUTION ORAL; RECTAL at 09:32

## 2024-10-05 RX ADMIN — Medication 1 DROP(S): at 22:31

## 2024-10-05 RX ADMIN — Medication 100 MILLILITER(S): at 22:31

## 2024-10-05 RX ADMIN — Medication 17 GRAM(S): at 00:51

## 2024-10-05 RX ADMIN — Medication 125 MICROGRAM(S): at 05:32

## 2024-10-05 RX ADMIN — KETOROLAC TROMETHAMINE 15 MILLIGRAM(S): 10 TABLET, FILM COATED ORAL at 23:56

## 2024-10-05 RX ADMIN — KETOROLAC TROMETHAMINE 15 MILLIGRAM(S): 10 TABLET, FILM COATED ORAL at 09:27

## 2024-10-05 RX ADMIN — KETOROLAC TROMETHAMINE 15 MILLIGRAM(S): 10 TABLET, FILM COATED ORAL at 10:55

## 2024-10-05 RX ADMIN — PANTOPRAZOLE SODIUM 40 MILLIGRAM(S): 40 TABLET, DELAYED RELEASE ORAL at 18:33

## 2024-10-05 RX ADMIN — Medication 4 MILLIGRAM(S): at 00:27

## 2024-10-05 RX ADMIN — BISACODYL 10 MILLIGRAM(S): 5 TABLET, COATED ORAL at 10:33

## 2024-10-05 RX ADMIN — Medication 750 MILLIGRAM(S): at 13:50

## 2024-10-05 RX ADMIN — Medication 750 MILLIGRAM(S): at 05:33

## 2024-10-05 RX ADMIN — LACTULOSE 10 GRAM(S): 10 SOLUTION ORAL; RECTAL at 09:27

## 2024-10-05 RX ADMIN — KETOROLAC TROMETHAMINE 15 MILLIGRAM(S): 10 TABLET, FILM COATED ORAL at 06:00

## 2024-10-05 RX ADMIN — KETOROLAC TROMETHAMINE 15 MILLIGRAM(S): 10 TABLET, FILM COATED ORAL at 00:40

## 2024-10-05 RX ADMIN — Medication 17 GRAM(S): at 05:32

## 2024-10-05 RX ADMIN — KETOROLAC TROMETHAMINE 15 MILLIGRAM(S): 10 TABLET, FILM COATED ORAL at 05:33

## 2024-10-05 RX ADMIN — KETOROLAC TROMETHAMINE 15 MILLIGRAM(S): 10 TABLET, FILM COATED ORAL at 01:10

## 2024-10-05 RX ADMIN — Medication 17 GRAM(S): at 17:56

## 2024-10-05 RX ADMIN — Medication 20 MILLIGRAM(S): at 12:45

## 2024-10-05 NOTE — PROGRESS NOTE ADULT - ASSESSMENT
39 year old man with a past medical history of hypothyroidism and deafness with cochlear implants presented for severe diffuse abdominal pain of a few hours duration.    #Severe diffuse abdominal pain. No guarding or rebound tenderness  #Possible Ileus based on CT  #Possible Gastroenteritis   #r/o parasitic infection-unlikely  - . Patient reported having diffuse abdominal pain, non radiating unbearable pain, that caused him to vomit a non-bilious non-bloody vomitus three times before presenting to the ED. on PE mildly tender abdomen upon palpation. No guarding or rebound tenderness.  - Vitals WNL.   - Labs WNL  - CT abdomen/pelvis with IV contrast: Focal segment of dilated distal small bowel loops within the lower pelvis with early fecalization. No discrete transition point. Nonspecific but   may reflect ileus or early obstruction  - Ultrasound testicles: No sonographic evidence of testicular torsion. Small left hydrocele with approximate volume of 2 mL  - Surgery: No surgical intervention  - Methocarbamol 750 standing q 8  - Ondansetron IV q8 for nausea  - Will order stool culture for bacteria/ova and parasite given occupational and travel history.   - Abdominal Xray done 10/4 AM, patient was complaining of new sudden severe abdominal pain  - Waiting for official read, Xray doesn't seem very concerning for perforation, seems like constipation  -lactulose, enema, miralax, senna  -IV ketorolac for pain      #Hypothyroidism  - On levothyroxine 125 mcg  - Patient last measured his TSH 2 years ago  -TSH -1.65    #Full code   DVT PPx - mobilization  GI PPx - none currently  Activity - AAT 39 year old man with a past medical history of hypothyroidism and deafness with cochlear implants presented for severe diffuse abdominal pain of a few hours duration.    #Severe diffuse abdominal pain. No guarding or rebound tenderness  #Possible Ileus based on CT  #Possible Gastroenteritis   #r/o parasitic infection-unlikely  - . Patient reported having diffuse abdominal pain, non radiating unbearable pain, that caused him to vomit a non-bilious non-bloody vomitus three times before presenting to the ED. on PE mildly tender abdomen upon palpation. No guarding or rebound tenderness.  - Vitals WNL.   - Labs WNL  - CT abdomen/pelvis with IV contrast: Focal segment of dilated distal small bowel loops within the lower pelvis with early fecalization. No discrete transition point. Nonspecific but   may reflect ileus or early obstruction  - Ultrasound testicles: No sonographic evidence of testicular torsion. Small left hydrocele with approximate volume of 2 mL  - Surgery: No surgical intervention  - Methocarbamol 750 standing q 8  - Ondansetron IV q8 for nausea  - Will order stool culture for bacteria/ova and parasite given occupational and travel history.   - Abdominal Xray done 10/4 AM, patient was complaining of new sudden severe abdominal pain  - Waiting for official read, Xray doesn't seem very concerning for perforation, seems like constipation  -lactulose, enema, miralax, senna  -IV ketorolac for pain  - f/u stool studies      #Hypothyroidism  - On levothyroxine 125 mcg  - Patient last measured his TSH 2 years ago  -TSH -1.65    #Full code   DVT PPx - mobilization  GI PPx - none currently  Activity - AAT 39 year old man with a past medical history of hypothyroidism and deafness with cochlear implants presented for severe diffuse abdominal pain of a few hours duration.    #Severe diffuse abdominal pain. No guarding or rebound tenderness  #Possible Ileus based on CT  #Possible Gastroenteritis   #r/o parasitic infection-unlikely  - . Patient reported having diffuse abdominal pain, non radiating unbearable pain, that caused him to vomit a non-bilious non-bloody vomitus three times before presenting to the ED. on PE mildly tender abdomen upon palpation. No guarding or rebound tenderness.  - Vitals WNL.   - Labs WNL  - CT abdomen/pelvis with IV contrast: Focal segment of dilated distal small bowel loops within the lower pelvis with early fecalization. No discrete transition point. Nonspecific but   may reflect ileus or early obstruction  - Ultrasound testicles: No sonographic evidence of testicular torsion. Small left hydrocele with approximate volume of 2 mL  - Surgery: No surgical intervention  - Methocarbamol 750 standing q 8  - Ondansetron IV q8 for nausea  - Abdominal Xray done 10/4 AM, patient was complaining of new sudden severe abdominal pain  - Waiting for official read, Xray doesn't seem very concerning for perforation, seems like constipation  -lactulose, enema, miralax, senna  -IV ketorolac for pain  - -dycycloverine PRN      #Hypothyroidism  - On levothyroxine 125 mcg  - Patient last measured his TSH 2 years ago  -TSH -1.65    #Full code   DVT PPx - mobilization  GI PPx - none currently  Activity - AAT 39 year old man with a past medical history of hypothyroidism and deafness with cochlear implants presented for severe diffuse abdominal pain of a few hours duration.    #Severe diffuse abdominal pain. No guarding or rebound tenderness  #Possible Ileus based on CT  #Possible Gastroenteritis   #r/o parasitic infection-unlikely  - . Patient reported having diffuse abdominal pain, non radiating unbearable pain, that caused him to vomit a non-bilious non-bloody vomitus three times before presenting to the ED. on PE mildly tender abdomen upon palpation. No guarding or rebound tenderness.  - Vitals WNL.   - Labs WNL  - CT abdomen/pelvis with IV contrast: Focal segment of dilated distal small bowel loops within the lower pelvis with early fecalization. No discrete transition point. Nonspecific but   may reflect ileus or early obstruction  - Ultrasound testicles: No sonographic evidence of testicular torsion. Small left hydrocele with approximate volume of 2 mL  - Surgery: No surgical intervention  - Methocarbamol 750 standing q 8  - Ondansetron IV q8 for nausea  - Abdominal Xray done 10/4 AM, patient was complaining of new sudden severe abdominal pain  - Waiting for official read, Xray doesn't seem very concerning for perforation, seems like constipation  -lactulose, enema, miralax, senna  -IV ketorolac for pain  - bentyl PRN      #Hypothyroidism  - On levothyroxine 125 mcg  - Patient last measured his TSH 2 years ago  -TSH -1.65    #Full code   DVT PPx - mobilization  GI PPx - none currently  Activity - AAT

## 2024-10-05 NOTE — PROGRESS NOTE ADULT - ATTENDING COMMENTS
patient states he had severe abd pain earlier  he had significant amount of bilioous vomiting  Patient was re evaluated and vomiting- he feels better    O/e   abd is soft now; NTP, non distended  no RUQ tenderness    # abdominal pain, nausea and vomiting  CT scan showing moderate stool and to r/o ileus  surgery evaluated- no intervention  Follow up abd X ray reviewed- showing small bowel distension and stool   s/p dulcolax supp and tap water enema- patient had some watery output  trial of fleet enema  patient unable to tolerated PO- diet - liquids as toelrated; if recurrent pain/and vomiting- might need NG tube   cont IV fluids  dulcolax daily   fleet daily  serial abd exam; repeat xray tomorrow  TSH- 1.6  mg- 1.9; k+- 3.9 patient states he had severe abd pain earlier  he had significant amount of bilioous vomiting  Patient was re evaluated after vomiting and he feels better    O/e   abd is soft now; NTP, non distended  no RUQ tenderness    # abdominal pain, nausea and vomiting  CT scan showing fecalization distal small bowel  and to r/o ileus  surgery evaluated- no obvious SBO, no intervention  TSH- 1.6, mg- 1.9; k+- 3.9  Follow up abd X ray reviewed- showing small bowel distension and stool   s/p dulcolax supp and tap water enema- patient had some watery output  trial of fleet enema  patient unable to tolerated PO-NPO for now; if recurrent pain/and vomiting- might need NG tube   cont IV fluids- increase to 100 ml/hr  PPI IV   dulcolax daily   fleet enema application X2  serial abd exam; repeat xray tomorrow

## 2024-10-05 NOTE — PROGRESS NOTE ADULT - SUBJECTIVE AND OBJECTIVE BOX
SUBJECTIVE/OVERNIGHT EVENTS  Today is hospital day 2d. This morning patient was seen and examined at bedside, resting comfortably in bed. No acute or major events overnight. Pt could not keep meds down. vomited multiple times.       MEDICATIONS  STANDING MEDICATIONS  influenza   Vaccine 0.5 milliLiter(s) IntraMuscular once  lactated ringers. 500 milliLiter(s) IV Continuous <Continuous>  lactulose Syrup 10 Gram(s) Oral every 1 hour  levothyroxine 125 MICROGram(s) Oral daily  melatonin 5 milliGRAM(s) Oral at bedtime  methocarbamol 750 milliGRAM(s) Oral three times a day  polyethylene glycol 3350 17 Gram(s) Oral two times a day  senna 2 Tablet(s) Oral at bedtime    PRN MEDICATIONS  ketorolac   Injectable 15 milliGRAM(s) IV Push every 6 hours PRN  ondansetron Injectable 4 milliGRAM(s) IV Push three times a day PRN    VITALS  T(F): 98 (10-05-24 @ 07:45), Max: 98.4 (10-05-24 @ 00:04)  HR: 67 (10-05-24 @ 07:45) (60 - 67)  BP: 120/71 (10-05-24 @ 07:45) (120/71 - 145/83)  RR: 18 (10-05-24 @ 07:45) (17 - 18)  SpO2: 95% (10-05-24 @ 07:45) (95% - 100%)    PHYSICAL EXAM  GENERAL  ( x ) NAD, lying in bed comfortably     (  ) obtunded     (  ) lethargic     (  ) somnolent    HEAD  (  ) Atraumatic     (  ) hematoma     (  ) laceration (specify location:       )     NECK  (  ) Supple     (  ) neck stiffness     (  ) nuchal rigidity     (  )  no JVD     (  ) JVD present ( -- cm)    HEART  Rate -->  (x  ) normal rate    (  ) bradycardic    (  ) tachycardic  Rhythm -->  (x  ) regular    (  ) regularly irregular    (  ) irregularly irregular  Murmurs -->  (  ) normal s1/s2    (  ) systolic murmur    (  ) diastolic murmur    (  ) continuous murmur     (  ) S3 present    (  ) S4 present    LUNGS  (x  )Unlabored respirations     (  ) tachypnea  (x  ) B/L air entry     (  ) decreased breath sounds in:  (location     )    (  ) no adventitious sound     (  ) crackles     (  ) wheezing      (  ) rhonchi      (specify location:       )  (  ) chest wall tenderness (specify location:       )    ABDOMEN  (  ) Soft     (  ) tense   |   (  ) nondistended     (  ) distended   |   (  ) +BS     (  ) hypoactive bowel sounds     (  ) hyperactive bowel sounds  (  ) nontender     (  ) RUQ tenderness     (  ) RLQ tenderness     (  ) LLQ tenderness     (  ) epigastric tenderness     (  ) diffuse tenderness  (  ) Splenomegaly      (  ) Hepatomegaly      (  ) Jaundice     (  ) ecchymosis   - mildly tense, no tenderness to palp    EXTREMITIES  (  x) Normal     (  ) Rash     (  ) ecchymosis     (  ) varicose veins      (  ) pitting edema     (  ) non-pitting edema   (  ) ulceration     (  ) gangrene:     (location:     )    NERVOUS SYSTEM  (  ) A&Ox3     (  ) confused     (  ) lethargic  CN II-XII:     (  ) Intact     (  ) focal deficits  (Specify:     )   Upper extremities:     (  ) strength X/5     (  ) focal deficit (specify:    )  Lower extremities:     (  ) strength  X/5    (  ) focal deficit (specify:    )    SKIN  (  ) No rashes or lesions     (  ) maculopapular rash     (  ) pustules     (  ) vesicles     (  ) ulcer     (  ) ecchymosis     (specify location:     )    (  ) Indwelling Andrews Catheter   Date insterted:    Reason (  ) Critical illness     (  ) urinary retention    (  ) Accurate Ins/Outs Monitoring     (  ) CMO patient    (  ) Central Line  Date inserted:  Location: (  ) Right IJ   (  ) Left IJ   (  ) Right Fem   (  ) Left Fem    (  ) SPC  (  ) pigtail  (  ) PEG tube  (  ) colostomy  (  ) jejunostomy  (  ) U-Dall    LABS             16.1   14.90 )-----------( 294      ( 10-05-24 @ 06:26 )             47.0     139  |  100  |  15  -------------------------<  105   10-05-24 @ 06:26  3.8  |  24  |  1.1    Ca      9.6     10-05-24 @ 06:26  Phos   2.9     10-05-24 @ 06:26  Mg     1.9     10-05-24 @ 06:26    TPro  6.0  /  Alb  4.0  /  TBili  0.8  /  DBili  x   /  AST  12  /  ALT  13  /  AlkPhos  52  /  GGT  x     10-04-24 @ 05:36        Urinalysis Basic - ( 05 Oct 2024 06:26 )    Color: x / Appearance: x / SG: x / pH: x  Gluc: 105 mg/dL / Ketone: x  / Bili: x / Urobili: x   Blood: x / Protein: x / Nitrite: x   Leuk Esterase: x / RBC: x / WBC x   Sq Epi: x / Non Sq Epi: x / Bacteria: x          Urinalysis with Rflx Culture (collected 03 Oct 2024 19:08)      IMAGING

## 2024-10-06 PROCEDURE — 74018 RADEX ABDOMEN 1 VIEW: CPT | Mod: 26

## 2024-10-06 PROCEDURE — 74177 CT ABD & PELVIS W/CONTRAST: CPT | Mod: 26

## 2024-10-06 PROCEDURE — 99232 SBSQ HOSP IP/OBS MODERATE 35: CPT

## 2024-10-06 PROCEDURE — 74018 RADEX ABDOMEN 1 VIEW: CPT | Mod: 26,77

## 2024-10-06 RX ORDER — ACETAMINOPHEN 325 MG
1000 TABLET ORAL ONCE
Refills: 0 | Status: COMPLETED | OUTPATIENT
Start: 2024-10-06 | End: 2024-10-06

## 2024-10-06 RX ORDER — HYDROMORPHONE HYDROCHLORIDE 1 MG/ML
0.2 INJECTION, SOLUTION INTRAMUSCULAR; INTRAVENOUS; SUBCUTANEOUS ONCE
Refills: 0 | Status: DISCONTINUED | OUTPATIENT
Start: 2024-10-06 | End: 2024-10-06

## 2024-10-06 RX ORDER — MINERAL OIL
133 OIL (ML) ORAL ONCE
Refills: 0 | Status: COMPLETED | OUTPATIENT
Start: 2024-10-06 | End: 2024-10-06

## 2024-10-06 RX ADMIN — KETOROLAC TROMETHAMINE 15 MILLIGRAM(S): 10 TABLET, FILM COATED ORAL at 14:43

## 2024-10-06 RX ADMIN — PANTOPRAZOLE SODIUM 40 MILLIGRAM(S): 40 TABLET, DELAYED RELEASE ORAL at 11:38

## 2024-10-06 RX ADMIN — Medication 1000 MILLIGRAM(S): at 16:45

## 2024-10-06 RX ADMIN — Medication 100 MILLILITER(S): at 21:21

## 2024-10-06 RX ADMIN — HYDROMORPHONE HYDROCHLORIDE 0.2 MILLIGRAM(S): 1 INJECTION, SOLUTION INTRAMUSCULAR; INTRAVENOUS; SUBCUTANEOUS at 14:58

## 2024-10-06 RX ADMIN — HYDROMORPHONE HYDROCHLORIDE 0.2 MILLIGRAM(S): 1 INJECTION, SOLUTION INTRAMUSCULAR; INTRAVENOUS; SUBCUTANEOUS at 15:47

## 2024-10-06 RX ADMIN — Medication 400 MILLIGRAM(S): at 16:25

## 2024-10-06 RX ADMIN — Medication 125 MICROGRAM(S): at 06:11

## 2024-10-06 RX ADMIN — KETOROLAC TROMETHAMINE 15 MILLIGRAM(S): 10 TABLET, FILM COATED ORAL at 14:19

## 2024-10-06 RX ADMIN — Medication 2 TABLET(S): at 21:21

## 2024-10-06 RX ADMIN — Medication 133 MILLILITER(S): at 10:46

## 2024-10-06 RX ADMIN — BISACODYL 10 MILLIGRAM(S): 5 TABLET, COATED ORAL at 11:38

## 2024-10-06 RX ADMIN — Medication 100 MILLILITER(S): at 06:11

## 2024-10-06 NOTE — PROGRESS NOTE ADULT - SUBJECTIVE AND OBJECTIVE BOX
HITESH WAYNE  39y  [facility] 4[address] 0[address]      Patient is a 39y old  Male who presents with a chief complaint of abdominal pain (05 Oct 2024 09:21)      My note supersedes the resident's note      INTERVAL HPI/OVERNIGHT EVENTS:  no bowel movements , only liquid return after enema      REVIEW OF SYSTEMS:  CONSTITUTIONAL: No fever, weight loss, or fatigue  EYES: No eye pain, visual disturbances, or discharge  ENMT:  No difficulty hearing, tinnitus, vertigo; No sinus or throat pain  NECK: No pain or stiffness  BREASTS: No pain, masses, or nipple discharge  RESPIRATORY: No cough, wheezing, chills or hemoptysis; No shortness of breath  CARDIOVASCULAR: No chest pain, palpitations, dizziness, or leg swelling  GASTROINTESTINAL:no bowel movements , only liquid return after enema  GENITOURINARY: No dysuria, frequency, hematuria, or incontinence  NEUROLOGICAL: No headaches, memory loss, loss of strength, numbness, or tremors  SKIN: No itching, burning, rashes, or lesions   LYMPH NODES: No enlarged glands  ENDOCRINE: No heat or cold intolerance; No hair loss  MUSCULOSKELETAL: No joint pain or swelling; No muscle, back, or extremity pain  PSYCHIATRIC: No depression, anxiety, mood swings, or difficulty sleeping  HEME/LYMPH: No easy bruising, or bleeding gums  ALLERY AND IMMUNOLOGIC: No hives or eczema  FAMILY HISTORY:    T(C): 36.7 (10-06-24 @ 07:58), Max: 37 (10-05-24 @ 15:00)  HR: 66 (10-06-24 @ 07:58) (66 - 80)  BP: 119/75 (10-06-24 @ 07:58) (119/75 - 130/72)  RR: 18 (10-06-24 @ 07:58) (18 - 18)  SpO2: 98% (10-06-24 @ 07:58) (97% - 98%)  Wt(kg): --Vital Signs Last 24 Hrs  T(C): 36.7 (06 Oct 2024 07:58), Max: 37 (05 Oct 2024 15:00)  T(F): 98.1 (06 Oct 2024 07:58), Max: 98.6 (05 Oct 2024 15:00)  HR: 66 (06 Oct 2024 07:58) (66 - 80)  BP: 119/75 (06 Oct 2024 07:58) (119/75 - 130/72)  BP(mean): --  RR: 18 (06 Oct 2024 07:58) (18 - 18)  SpO2: 98% (06 Oct 2024 07:58) (97% - 98%)    Parameters below as of 06 Oct 2024 07:58  Patient On (Oxygen Delivery Method): room air        PHYSICAL EXAM:  GENERAL: NAD,   HEAD:  Atraumatic, Normocephalic  EYES: EOMI, PERRLA, conjunctiva and sclera clear  ENMT: No tonsillar erythema, exudates, or enlargement; Moist mucous membranes, Good dentition, No lesions  NECK: Supple, No JVD, Normal thyroid  NERVOUS SYSTEM:  Alert & Oriented X3, Good concentration; Motor Strength 5/5 B/L upper and lower extremities; DTRs 2+ intact and symmetric  PULM: Clear to auscultation bilaterally  CARDIAC: Regular rate and rhythm; No murmurs, rubs, or gallops  GI: Soft, Nontender, Nondistended; Bowel sounds present  EXTREMITIES:  2+ Peripheral Pulses, No clubbing, cyanosis, or edema  LYMPH: No lymphadenopathy noted  SKIN: No rashes or lesions    Consultant(s) Notes Reviewed:  [x ] YES  [ ] NO  Care Discussed with Consultants/Other Providers [ x] YES  [ ] NO    LABS:                            16.1   14.90 )-----------( 294      ( 05 Oct 2024 06:26 )             47.0   10-05    139  |  100  |  15  ----------------------------<  105[H]  3.8   |  24  |  1.1    Ca    9.6      05 Oct 2024 06:26  Phos  2.9     10-05  Mg     1.9     10-05              Culture - Stool (collected 04 Oct 2024 18:01)  Source: .Stool  Preliminary Report (06 Oct 2024 07:16):    No enteric pathogens to date: Final culture pending    Urinalysis with Rflx Culture (collected 03 Oct 2024 19:08)      bisacodyl Suppository 10 milliGRAM(s) Rectal daily  influenza   Vaccine 0.5 milliLiter(s) IntraMuscular once  ketorolac   Injectable 15 milliGRAM(s) IV Push every 6 hours PRN  lactated ringers. 1000 milliLiter(s) IV Continuous <Continuous>  levothyroxine 125 MICROGram(s) Oral daily  melatonin 5 milliGRAM(s) Oral at bedtime  ondansetron Injectable 4 milliGRAM(s) IV Push three times a day PRN  pantoprazole  Injectable 40 milliGRAM(s) IV Push daily  polyethylene glycol 3350 17 Gram(s) Oral two times a day  senna 2 Tablet(s) Oral at bedtime      HEALTH ISSUES - PROBLEM Dx:          Case Discussed with House Staff   45 minutes spent on total encounter; more than 50% of the visit was spent counseling and/or coordinating care by the attending physician.    Spectra [phone]

## 2024-10-06 NOTE — DIETITIAN INITIAL EVALUATION ADULT - OTHER INFO
39 year old man with a past medical history of hypothyroidism and deafness with cochlear implants presented for severe diffuse abdominal pain of a few hours duration.    #Severe diffuse abdominal pain. No guarding or rebound tenderness  ct shows distention but no obstruction   gi consult pending  continue with enema  #Deafness with cochlear implants

## 2024-10-06 NOTE — PROVIDER CONTACT NOTE (OTHER) - ASSESSMENT
pt stable.
pt emesis large amount green and bilious, around half a emesis bag worth, pt reports pain subsiding after vomiting
pt stable
pt stable
pt extreme pain in umbilical area

## 2024-10-06 NOTE — DIETITIAN INITIAL EVALUATION ADULT - PERTINENT MEDS FT
MEDICATIONS  (STANDING):  bisacodyl Suppository 10 milliGRAM(s) Rectal daily  influenza   Vaccine 0.5 milliLiter(s) IntraMuscular once  lactated ringers. 1000 milliLiter(s) (100 mL/Hr) IV Continuous <Continuous>  levothyroxine 125 MICROGram(s) Oral daily  melatonin 5 milliGRAM(s) Oral at bedtime  pantoprazole  Injectable 40 milliGRAM(s) IV Push daily  polyethylene glycol 3350 17 Gram(s) Oral two times a day  senna 2 Tablet(s) Oral at bedtime    MEDICATIONS  (PRN):  ketorolac   Injectable 15 milliGRAM(s) IV Push every 6 hours PRN Severe Pain (7 - 10)  ondansetron Injectable 4 milliGRAM(s) IV Push three times a day PRN Nausea and/or Vomiting

## 2024-10-06 NOTE — PROVIDER CONTACT NOTE (OTHER) - SITUATION
pt still w/no BM developed Hiccups.
pt emesis large amount green and bilious, around half a emesis bag worth
pt extreme pain in umbilical area
pt complain of hiccups returning and increased nausea and pain.
pt complaining of worsening pain 10/10, return of hiccups, numbness in fingers and sweating.

## 2024-10-06 NOTE — DIETITIAN INITIAL EVALUATION ADULT - ADD RECOMMEND
1. Continue with current diet order per MD/GI - advance as tolerated when medically feasible  2. If pt upgraded to Clears please add Ensure Clear 3x/day (240 kcal/8gm protein per serving) to optimize nutrient needs  3. Monitor diet    Pt at high risk f/u in 3-5 days or prn

## 2024-10-06 NOTE — DIETITIAN INITIAL EVALUATION ADULT - ORAL INTAKE PTA/DIET HISTORY
Pt reports fair PO intake PTA. 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 Reports he eats more regularly on the weekends when he is at home. Pt takes herbal tonic supplements at home sometimes. Restricts pork from his diet. NKFA. Reports  lb taken 1 week ago, states he is trying to lose weight intentionally.

## 2024-10-06 NOTE — DIETITIAN INITIAL EVALUATION ADULT - COLLABORATION WITH OTHER PROVIDERS
Intervention: coordination of care  Monitoring/Evaluation: diet order advancement/tolerance, intake, weights, labs, GI s/s, BG, skin status, NFPE

## 2024-10-06 NOTE — PROGRESS NOTE ADULT - ASSESSMENT
39 year old man with a past medical history of hypothyroidism and deafness with cochlear implants presented for severe diffuse abdominal pain of a few hours duration.    #Severe diffuse abdominal pain. No guarding or rebound tenderness  ct shows distention but no obstruction   gi consult pending  continue with enema     #Hypothyroidism   levothyroxine 125 mcg    #Deafness with cochlear implants     #Insomnia melatonin     PROGRESS NOTE HANDOFF    Pending: gi consult     Family discussion: patient verbalized understanding and agreeable to plan of care     Disposition: Home

## 2024-10-06 NOTE — DIETITIAN INITIAL EVALUATION ADULT - PERTINENT LABORATORY DATA
10-05    139  |  100  |  15  ----------------------------<  105[H]  3.8   |  24  |  1.1    Ca    9.6      05 Oct 2024 06:26  Phos  2.9     10-05  Mg     1.9     10-05

## 2024-10-07 LAB
ALBUMIN SERPL ELPH-MCNC: 4.4 G/DL — SIGNIFICANT CHANGE UP (ref 3.5–5.2)
ALP SERPL-CCNC: 62 U/L — SIGNIFICANT CHANGE UP (ref 30–115)
ALT FLD-CCNC: 10 U/L — SIGNIFICANT CHANGE UP (ref 0–41)
ANION GAP SERPL CALC-SCNC: 17 MMOL/L — HIGH (ref 7–14)
APTT BLD: 30.8 SEC — SIGNIFICANT CHANGE UP (ref 27–39.2)
APTT BLD: 43.4 SEC — HIGH (ref 27–39.2)
AST SERPL-CCNC: 13 U/L — SIGNIFICANT CHANGE UP (ref 0–41)
BASOPHILS # BLD AUTO: 0.03 K/UL — SIGNIFICANT CHANGE UP (ref 0–0.2)
BASOPHILS NFR BLD AUTO: 0.3 % — SIGNIFICANT CHANGE UP (ref 0–1)
BILIRUB SERPL-MCNC: 1 MG/DL — SIGNIFICANT CHANGE UP (ref 0.2–1.2)
BLD GP AB SCN SERPL QL: SIGNIFICANT CHANGE UP
BUN SERPL-MCNC: 15 MG/DL — SIGNIFICANT CHANGE UP (ref 10–20)
CALCIUM SERPL-MCNC: 9.5 MG/DL — SIGNIFICANT CHANGE UP (ref 8.4–10.5)
CHLORIDE SERPL-SCNC: 98 MMOL/L — SIGNIFICANT CHANGE UP (ref 98–110)
CO2 SERPL-SCNC: 23 MMOL/L — SIGNIFICANT CHANGE UP (ref 17–32)
CREAT SERPL-MCNC: 0.9 MG/DL — SIGNIFICANT CHANGE UP (ref 0.7–1.5)
CULTURE RESULTS: SIGNIFICANT CHANGE UP
CULTURE RESULTS: SIGNIFICANT CHANGE UP
EGFR: 111 ML/MIN/1.73M2 — SIGNIFICANT CHANGE UP
EOSINOPHIL # BLD AUTO: 0.09 K/UL — SIGNIFICANT CHANGE UP (ref 0–0.7)
EOSINOPHIL NFR BLD AUTO: 0.9 % — SIGNIFICANT CHANGE UP (ref 0–8)
GLUCOSE SERPL-MCNC: 77 MG/DL — SIGNIFICANT CHANGE UP (ref 70–99)
HCT VFR BLD CALC: 45.6 % — SIGNIFICANT CHANGE UP (ref 42–52)
HGB BLD-MCNC: 16 G/DL — SIGNIFICANT CHANGE UP (ref 14–18)
IMM GRANULOCYTES NFR BLD AUTO: 0.3 % — SIGNIFICANT CHANGE UP (ref 0.1–0.3)
INR BLD: 1.1 RATIO — SIGNIFICANT CHANGE UP (ref 0.65–1.3)
INR BLD: 1.19 RATIO — SIGNIFICANT CHANGE UP (ref 0.65–1.3)
LYMPHOCYTES # BLD AUTO: 1.56 K/UL — SIGNIFICANT CHANGE UP (ref 1.2–3.4)
LYMPHOCYTES # BLD AUTO: 16.5 % — LOW (ref 20.5–51.1)
MAGNESIUM SERPL-MCNC: 2 MG/DL — SIGNIFICANT CHANGE UP (ref 1.8–2.4)
MCHC RBC-ENTMCNC: 30.3 PG — SIGNIFICANT CHANGE UP (ref 27–31)
MCHC RBC-ENTMCNC: 35.1 G/DL — SIGNIFICANT CHANGE UP (ref 32–37)
MCV RBC AUTO: 86.4 FL — SIGNIFICANT CHANGE UP (ref 80–94)
MONOCYTES # BLD AUTO: 0.78 K/UL — HIGH (ref 0.1–0.6)
MONOCYTES NFR BLD AUTO: 8.2 % — SIGNIFICANT CHANGE UP (ref 1.7–9.3)
NEUTROPHILS # BLD AUTO: 6.99 K/UL — HIGH (ref 1.4–6.5)
NEUTROPHILS NFR BLD AUTO: 73.8 % — SIGNIFICANT CHANGE UP (ref 42.2–75.2)
NRBC # BLD: 0 /100 WBCS — SIGNIFICANT CHANGE UP (ref 0–0)
PLATELET # BLD AUTO: 321 K/UL — SIGNIFICANT CHANGE UP (ref 130–400)
PMV BLD: 9.9 FL — SIGNIFICANT CHANGE UP (ref 7.4–10.4)
POTASSIUM SERPL-MCNC: 3.8 MMOL/L — SIGNIFICANT CHANGE UP (ref 3.5–5)
POTASSIUM SERPL-SCNC: 3.8 MMOL/L — SIGNIFICANT CHANGE UP (ref 3.5–5)
PROT SERPL-MCNC: 6.7 G/DL — SIGNIFICANT CHANGE UP (ref 6–8)
PROTHROM AB SERPL-ACNC: 12.6 SEC — SIGNIFICANT CHANGE UP (ref 9.95–12.87)
PROTHROM AB SERPL-ACNC: 13.6 SEC — HIGH (ref 9.95–12.87)
RBC # BLD: 5.28 M/UL — SIGNIFICANT CHANGE UP (ref 4.7–6.1)
RBC # FLD: 12.6 % — SIGNIFICANT CHANGE UP (ref 11.5–14.5)
SODIUM SERPL-SCNC: 138 MMOL/L — SIGNIFICANT CHANGE UP (ref 135–146)
SPECIMEN SOURCE: SIGNIFICANT CHANGE UP
SPECIMEN SOURCE: SIGNIFICANT CHANGE UP
WBC # BLD: 9.48 K/UL — SIGNIFICANT CHANGE UP (ref 4.8–10.8)
WBC # FLD AUTO: 9.48 K/UL — SIGNIFICANT CHANGE UP (ref 4.8–10.8)

## 2024-10-07 PROCEDURE — 99233 SBSQ HOSP IP/OBS HIGH 50: CPT

## 2024-10-07 PROCEDURE — 71045 X-RAY EXAM CHEST 1 VIEW: CPT | Mod: 26

## 2024-10-07 PROCEDURE — 74177 CT ABD & PELVIS W/CONTRAST: CPT | Mod: 26

## 2024-10-07 PROCEDURE — 99223 1ST HOSP IP/OBS HIGH 75: CPT

## 2024-10-07 RX ORDER — VANCOMYCIN HCL-SODIUM CHLORIDE IV SOLN 1.5 GM/250ML-0.9% 1.5-0.9/25 GM/ML-%
1000 SOLUTION INTRAVENOUS EVERY 12 HOURS
Refills: 0 | Status: DISCONTINUED | OUTPATIENT
Start: 2024-10-07 | End: 2024-10-07

## 2024-10-07 RX ORDER — AMPICILLIN, SULBACTAM 250; 125 MG/ML; MG/ML
INJECTION, POWDER, FOR SOLUTION INTRAMUSCULAR; INTRAVENOUS
Refills: 0 | Status: DISCONTINUED | OUTPATIENT
Start: 2024-10-07 | End: 2024-10-09

## 2024-10-07 RX ORDER — AMPICILLIN, SULBACTAM 250; 125 MG/ML; MG/ML
3 INJECTION, POWDER, FOR SOLUTION INTRAMUSCULAR; INTRAVENOUS ONCE
Refills: 0 | Status: COMPLETED | OUTPATIENT
Start: 2024-10-07 | End: 2024-10-07

## 2024-10-07 RX ORDER — IOHEXOL 350 MG/ML
30 INJECTION, SOLUTION INTRAVENOUS ONCE
Refills: 0 | Status: COMPLETED | OUTPATIENT
Start: 2024-10-07 | End: 2024-10-07

## 2024-10-07 RX ORDER — HYDROMORPHONE HYDROCHLORIDE 1 MG/ML
0.2 INJECTION, SOLUTION INTRAMUSCULAR; INTRAVENOUS; SUBCUTANEOUS EVERY 4 HOURS
Refills: 0 | Status: DISCONTINUED | OUTPATIENT
Start: 2024-10-07 | End: 2024-10-09

## 2024-10-07 RX ORDER — HYDROMORPHONE HYDROCHLORIDE 1 MG/ML
0.5 INJECTION, SOLUTION INTRAMUSCULAR; INTRAVENOUS; SUBCUTANEOUS ONCE
Refills: 0 | Status: DISCONTINUED | OUTPATIENT
Start: 2024-10-07 | End: 2024-10-07

## 2024-10-07 RX ORDER — AMPICILLIN, SULBACTAM 250; 125 MG/ML; MG/ML
3 INJECTION, POWDER, FOR SOLUTION INTRAMUSCULAR; INTRAVENOUS EVERY 6 HOURS
Refills: 0 | Status: DISCONTINUED | OUTPATIENT
Start: 2024-10-07 | End: 2024-10-09

## 2024-10-07 RX ADMIN — AMPICILLIN, SULBACTAM 200 GRAM(S): 250; 125 INJECTION, POWDER, FOR SOLUTION INTRAMUSCULAR; INTRAVENOUS at 17:04

## 2024-10-07 RX ADMIN — KETOROLAC TROMETHAMINE 15 MILLIGRAM(S): 10 TABLET, FILM COATED ORAL at 01:40

## 2024-10-07 RX ADMIN — AMPICILLIN, SULBACTAM 200 GRAM(S): 250; 125 INJECTION, POWDER, FOR SOLUTION INTRAMUSCULAR; INTRAVENOUS at 10:04

## 2024-10-07 RX ADMIN — HYDROMORPHONE HYDROCHLORIDE 0.5 MILLIGRAM(S): 1 INJECTION, SOLUTION INTRAMUSCULAR; INTRAVENOUS; SUBCUTANEOUS at 09:16

## 2024-10-07 RX ADMIN — HYDROMORPHONE HYDROCHLORIDE 0.5 MILLIGRAM(S): 1 INJECTION, SOLUTION INTRAMUSCULAR; INTRAVENOUS; SUBCUTANEOUS at 08:38

## 2024-10-07 RX ADMIN — KETOROLAC TROMETHAMINE 15 MILLIGRAM(S): 10 TABLET, FILM COATED ORAL at 07:34

## 2024-10-07 RX ADMIN — Medication 4 MILLIGRAM(S): at 09:16

## 2024-10-07 RX ADMIN — Medication 100 MILLILITER(S): at 23:07

## 2024-10-07 RX ADMIN — Medication 125 MICROGRAM(S): at 07:05

## 2024-10-07 RX ADMIN — Medication 1 SPRAY(S): at 17:04

## 2024-10-07 RX ADMIN — KETOROLAC TROMETHAMINE 15 MILLIGRAM(S): 10 TABLET, FILM COATED ORAL at 08:04

## 2024-10-07 RX ADMIN — KETOROLAC TROMETHAMINE 15 MILLIGRAM(S): 10 TABLET, FILM COATED ORAL at 00:14

## 2024-10-07 RX ADMIN — HYDROMORPHONE HYDROCHLORIDE 0.5 MILLIGRAM(S): 1 INJECTION, SOLUTION INTRAMUSCULAR; INTRAVENOUS; SUBCUTANEOUS at 09:46

## 2024-10-07 RX ADMIN — BISACODYL 10 MILLIGRAM(S): 5 TABLET, COATED ORAL at 11:14

## 2024-10-07 RX ADMIN — PANTOPRAZOLE SODIUM 40 MILLIGRAM(S): 40 TABLET, DELAYED RELEASE ORAL at 11:14

## 2024-10-07 RX ADMIN — AMPICILLIN, SULBACTAM 200 GRAM(S): 250; 125 INJECTION, POWDER, FOR SOLUTION INTRAMUSCULAR; INTRAVENOUS at 23:04

## 2024-10-07 RX ADMIN — IOHEXOL 30 MILLILITER(S): 350 INJECTION, SOLUTION INTRAVENOUS at 12:39

## 2024-10-07 RX ADMIN — AMPICILLIN, SULBACTAM 200 GRAM(S): 250; 125 INJECTION, POWDER, FOR SOLUTION INTRAMUSCULAR; INTRAVENOUS at 11:15

## 2024-10-07 RX ADMIN — Medication 100 MILLILITER(S): at 15:59

## 2024-10-07 RX ADMIN — HYDROMORPHONE HYDROCHLORIDE 0.5 MILLIGRAM(S): 1 INJECTION, SOLUTION INTRAMUSCULAR; INTRAVENOUS; SUBCUTANEOUS at 05:48

## 2024-10-07 RX ADMIN — HYDROMORPHONE HYDROCHLORIDE 0.5 MILLIGRAM(S): 1 INJECTION, SOLUTION INTRAMUSCULAR; INTRAVENOUS; SUBCUTANEOUS at 09:18

## 2024-10-07 RX ADMIN — HYDROMORPHONE HYDROCHLORIDE 0.5 MILLIGRAM(S): 1 INJECTION, SOLUTION INTRAMUSCULAR; INTRAVENOUS; SUBCUTANEOUS at 04:48

## 2024-10-07 NOTE — CONSULT NOTE ADULT - ATTENDING COMMENTS
Trauma/Acute Care Surgery Attending Note Attestation  Patient was seen and examined bedside on 10/3.  I reviewed the resident/PA note and agreed with above assessment and plan with following additions and corrections.    History as above.    T(C): 36.8 (10-03-24 @ 18:04), Max: 36.8 (10-03-24 @ 18:04)  HR: 96 (10-03-24 @ 18:04) (96 - 96)  BP: 136/88 (10-03-24 @ 18:04) (136/88 - 136/88)  RR: 18 (10-03-24 @ 18:04) (18 - 18)  SpO2: 99% (10-03-24 @ 18:04) (99% - 99%)    I independently performed a medically appropriate exam. The exam was notable for primarily suprapubic and LLQ tenderness with guarding.                           15.6   9.80  )-----------( 277      ( 03 Oct 2024 19:20 )             45.0     10-03    137  |  100  |  15  ----------------------------<  122[H]  4.5   |  24  |  1.0    Ca 9.5; Mg x ; Phos x       ( 03 Oct 2024 19:20 )  Alb: 4.8 g/dL / Pro: 7.1 g/dL / AlkPhos: 64 U/L / ALT: 17 U/L / AST: 16 U/L / GGT:x     / Tbili 0.6 mg/dL/ Dbili x     / Indbili x          Lactate, Blood: 1.7 mmol/L (10-03 @ 19:20)    CT A/P reviewed and interpreted by me: focal area of narrowing of distal ileum but no significant bowel dilation, hyperdense material in distal small bowel    Assessment/Plan:  39y Male PMH hypothyroidism who presents with abdominal pain. Patient has never had abdominal surgery before, and CT scan does not show obvious evidence of small bowel obstruction. No acute surgical intervention. Patient has family history of IBS but no family history of inflammatory bowel disease. No definitive cause identified for patient's pain or tenderness on exam, but there is no obvious surgical pathology. Please call back with questions.     Mitchell Lopez MD  Trauma/Acute Care Surgery/Surgical Critical Care Attending
40yo male presenting with abdominal pain and concern for SBO. Recommend npo, ngt decompression, ivf. Trend lactate, check crp. Follow up CT imaging and surgery recommendations.

## 2024-10-07 NOTE — CONSULT NOTE ADULT - SUBJECTIVE AND OBJECTIVE BOX
GENERAL SURGERY CONSULT NOTE    Patient: HITESH WAYNE , 39y ([birth year])Male   MRN: [id number]  Location: [facility] ED  Visit: 10-03-24 Emergency  Date: 10-03-24 @ 22:10    HPI: 39 year old male with past medical history significant for hypothyroidism who presents to the ED with complaints of acute onset abdominal pain starting at 9am this morning. Patient states that he has a shot of vodka with a teaspoon of black pepper because he thought this would improve his symptoms, after which he started vomiting (non bloody, non bilious). He denies any fever at home. No history of inflammatory bowel disease, denies ever having a colonoscopy. He also tried Tums, GasX, and Pepto Bismol with no improvement.     General surgery consulted given CT findings concerning for possible early obstruction vs. ileus.     PAST MEDICAL & SURGICAL HISTORY:  None    Home Medications:  None    VITALS:  T(F): 98.2 (10-03-24 @ 18:04), Max: 98.2 (10-03-24 @ 18:04)  HR: 96 (10-03-24 @ 18:04) (96 - 96)  BP: 136/88 (10-03-24 @ 18:04) (136/88 - 136/88)  RR: 18 (10-03-24 @ 18:04) (18 - 18)  SpO2: 99% (10-03-24 @ 18:04) (99% - 99%)    PHYSICAL EXAM:  General: NAD, AAOx3, calm and cooperative  Cardiac: RRR S1, S2  Respiratory: CTAB, normal respiratory effort  Abdomen: Soft, non-distended, tender to palpation in the lower quadrants with guarding   Vascular: Pulses 2+ throughout, extremities well perfused  Skin: Warm/dry, normal color, no jaundice    MEDICATIONS  (STANDING):    MEDICATIONS  (PRN):      LAB/STUDIES:                        15.6   9.80  )-----------( 277      ( 03 Oct 2024 19:20 )             45.0     10-03    137  |  100  |  15  ----------------------------<  122[H]  4.5   |  24  |  1.0    Ca    9.5      03 Oct 2024 19:20    TPro  7.1  /  Alb  4.8  /  TBili  0.6  /  DBili  x   /  AST  16  /  ALT  17  /  AlkPhos  64  10-03    LIVER FUNCTIONS - ( 03 Oct 2024 19:20 )  Alb: 4.8 g/dL / Pro: 7.1 g/dL / ALK PHOS: 64 U/L / ALT: 17 U/L / AST: 16 U/L / GGT: x           Urinalysis Basic - ( 03 Oct 2024 19:20 )    Color: x / Appearance: x / SG: x / pH: x  Gluc: 122 mg/dL / Ketone: x  / Bili: x / Urobili: x   Blood: x / Protein: x / Nitrite: x   Leuk Esterase: x / RBC: x / WBC x   Sq Epi: x / Non Sq Epi: x / Bacteria: x    Urinalysis with Rflx Culture (collected 03 Oct 2024 19:08)    IMAGING:  < from: CT Abdomen and Pelvis w/ IV Cont (10.03.24 @ 19:27) >  IMPRESSION:  Focal segment of dilated distal small bowel loops within the lower pelvis   with early fecalization. No discrete transition point. Nonspecific but   may reflect ileus or early obstruction    < end of copied text >        
Gastroenterology Initial Consult Note      Location: [facility] 4B 017 B [facility]  Patient Name: HITESH WAYNE  Age: 39y  Gender: Male      Chief Complaint  Patient is a 39y old Male who presents with a chief complaint of Abdominal Pain (07 Oct 2024 09:03)  Primary diagnosis of Abdominal pain      Reason for Consult  Periumbilical Pain with Nausea, Vomiting, and constipation  Gaseous Distention of SB/Ileum upto 4.4cm on 10/06 with Moderate Colon Stool [place]  Suspect SBO; Rule Out Ileus VS Impaction  No Evidence of Anemia      History of Present Illness  39 year old male patient with history of hypothyroidim and deafness s/p cochlear implants who presented to the ED on 10/03 for evaluation of diffuse abdominal pain associated with nausea and vomiting, found to have mild distention of ileum upto 4.4cm and colon stool burden with concern for ileus/early SBO on imaging. We are consulted in setting of ileus versus early SBO.  Summary:  * Presented on 10/03 for diffuse abdominal pain with nausea and non bilious non bloody vomiting; constipation; no unintentional weight loss or melena or hematochezia; uses ibuprofen few times per month (last time was 2 weeks ago)  * Last BM was on 10/03; last flatus was on 10/03; vomited 4 times from 10/03-10/05  * Vitals: 136/88mmHg, HR 96 bpm, T 36.8 degrees  * Labs: WBC 9.8 on 10/03 -> 11.9 on 10/05  * Hb trend: 15.6 on 10/03 -> 16.1 on 10/05  * No INR in chart; LA 1 on 10/04  * EFREN 10/07 brown stools  * CT Abdomen and Pelvis w/ IV Cont (10.03.24 @ 19:27) Focal segment of dilated distal small bowel loops within the lower pelvis with early fecalization. No discrete transition point. Nonspecific but may reflect ileus or early obstruction  * XR abdomen 10/04 x2 moderate fecal load without bowel dilation -> repeat on 10/04 PM: air filled loops of dilated SB; slightly increased; unchanged moderate colon stool burden  * Repeat KUB 10/05 gaseous distention of SB  * Repeat KUB 10/06 stable dilated SB loops to 4.4cm; stool in colon; cannot exclude SBO -> repeat KUB on 10/06: redemonstrated SBO  * Repeat CT abdomen pelvis IC on 10/06: Increased extent of multiple loops of mildly distended small bowel, dilated up to 3.2 cm with transition to collapsed bowel within right lower abdomen. Small bowel obstruction suspected.  * FHx of IBS but no IBD or GI cancers  * No previous EGD or colonoscopy        Prior EGD:  no prior      Prior Colonoscopy:  no prior      Past Medical and Surgical History:  per       Home Medications:  levothyroxine 125 mcg (0.125 mg) oral capsule: 1 cap(s) orally once a day (03 Oct 2024 23:43)      Social History:  Tobacco: denies  Alcohol: occasional  Drugs: denies      Allergies:  No Known Allergies      Family History:  as below        Vital Signs in the last 24 hours   Vitals Summary T(C): 37 (10-07-24 @ 15:15), Max: 37 (10-07-24 @ 15:15)  HR: 87 (10-07-24 @ 15:15) (87 - 87)  BP: 114/82 (10-07-24 @ 15:15) (114/82 - 114/82)  RR: 18 (10-07-24 @ 15:15) (18 - 18)  SpO2: 96% (10-07-24 @ 15:15) (96% - 96%)  Vent Data   Intake/ Output   10-06-24 @ 07:01  -  10-07-24 @ 07:00  --------------------------------------------------------  IN: 1300 mL / OUT: 0 mL / NET: 1300 mL    10-07-24 @ 07:01  -  10-07-24 @ 19:42  --------------------------------------------------------  IN: 1100 mL / OUT: 0 mL / NET: 1100 mL       Physical Exam  * General Appearance: Alert, cooperative, interactive, oriented to time, place, and person, in no acute distress  * Eyes: PERRL, conjunctiva/corneas clear, EOM's intact, fundi benign, both eyes  * Lungs: Good bilateral air entry, normal breath sounds   * Heart: Regular Rate and Rhythm, normal S1 and S2, no audible murmur, rub, or gallop  * Abdomen: Symmetric, softly distended, non-tender but in pain, bowel sounds active all four quadrants, no masses  * Rectal: Brown stool, Normal tone, no palpable masses or tenderness      Investigations   Laboratory Workup      - CBC:                        16.0   9.48  )-----------( 321      ( 07 Oct 2024 08:15 )             45.6       - Hgb Trend:  16.0  10-07-24 @ 08:15  16.1  10-05-24 @ 06:26          - Chemistry:  10-07    138  |  98  |  15  ----------------------------<  77  3.8   |  23  |  0.9    Ca    9.5      07 Oct 2024 08:15  Mg     2.0     10-07    TPro  6.7  /  Alb  4.4  /  TBili  1.0  /  DBili  x   /  AST  13  /  ALT  10  /  AlkPhos  62  10-07    Liver panel trend:  TBili 1.0   /   AST 13   /   ALT 10   /   AlkP 62   /   Tptn 6.7   /   Alb 4.4    /   DBili --      10-07  TBili 0.8   /   AST 12   /   ALT 13   /   AlkP 52   /   Tptn 6.0   /   Alb 4.0    /   DBili --      10-04  TBili 0.6   /   AST 16   /   ALT 17   /   AlkP 64   /   Tptn 7.1   /   Alb 4.8    /   DBili --      10-03      - Coagulation Studies:  PT/INR - ( 07 Oct 2024 11:53 )   PT: 13.60 sec;   INR: 1.19 ratio         PTT - ( 07 Oct 2024 11:53 )  PTT:30.8 sec    - ABG:      - Cardiac Markers:        Microbiological Workup  Urinalysis Basic - ( 07 Oct 2024 08:15 )    Color: x / Appearance: x / SG: x / pH: x  Gluc: 77 mg/dL / Ketone: x  / Bili: x / Urobili: x   Blood: x / Protein: x / Nitrite: x   Leuk Esterase: x / RBC: x / WBC x   Sq Epi: x / Non Sq Epi: x / Bacteria: x          Radiological Workup  CT Abdomen and Pelvis w/ IV Cont:   ACC: [id number] EXAM:  CT ABDOMEN AND PELVIS IC   ORDERED BY: LM LOMELI     PROCEDURE DATE:  10/06/2024          INTERPRETATION:  CLINICAL STATEMENT: Abdominal pain. Nausea. Vomiting.    TECHNIQUE: Contiguous axial CT images were obtained from the lower chest   to the pubic symphysis following administration of 95 cc Omnipaque 350   intravenous contrast.  Oral contrast was not administered.  Reformatted   images in the coronal and sagittal planes were acquired. 5 cc contrast   discarded.    Comparison made with CT abdomen and pelvis 10/3/2024.    FINDINGS:    LOWER CHEST: Unremarkable.    HEPATOBILIARY: Unchanged.    SPLEEN: Unremarkable.    PANCREAS: Unremarkable.    ADRENAL GLANDS: Unremarkable.    KIDNEYS: No hydronephrosis.    ABDOMINOPELVIC NODES: Unremarkable.    PELVIC ORGANS: Unremarkable.    PERITONEUM/MESENTERY/BOWEL: Increased extent of multiple loops of mildly   distended small bowel, dilated up to 3.2 cm with transition to collapsed   bowel within right lower abdomen (series 4, image 249). Mild ascites.   Normal appendix. No pneumoperitoneum.    BONES/SOFT TISSUES: Stable osseous structures. New subcutaneous gas   within right medial gluteal fold and ischioanal fossa without any   noticeable inflammatory change, of indeterminate clinical significance; a   necrotizing infection is a consideration in the appropriate clinical   setting (series 5, image [id number]).      IMPRESSION:  Since 10/3/2024:    Increased extent of multiple loops of mildly distended small bowel,   dilated up to 3.2 cm with transition to collapsed bowel within right   lower abdomen. Small bowel obstruction suspected.    New subcutaneous gas within right medial gluteal fold and ischioanal   fossa without any noticeable inflammatory change, of indeterminate   clinical significance; a necrotizing infection is a consideration in the   appropriate clinical setting.    Spoke with Dr. Canales    --- End of Report ---            SHERLEY BURGESS MD; Attending Radiologist  This document has been electronically signed. Oct  6 2024 10:25PM (10-06-24 @ 21:47)            Current Medications  Standing Medications  ampicillin/sulbactam  IVPB 3 Gram(s) IV Intermittent every 6 hours  ampicillin/sulbactam  IVPB      bisacodyl Suppository 10 milliGRAM(s) Rectal daily  influenza   Vaccine 0.5 milliLiter(s) IntraMuscular once  lactated ringers. 1000 milliLiter(s) (100 mL/Hr) IV Continuous <Continuous>  levothyroxine 125 MICROGram(s) Oral daily  melatonin 5 milliGRAM(s) Oral at bedtime  pantoprazole  Injectable 40 milliGRAM(s) IV Push daily  polyethylene glycol 3350 17 Gram(s) Oral two times a day  senna 2 Tablet(s) Oral at bedtime    PRN Medications  HYDROmorphone  Injectable 0.2 milliGRAM(s) IV Push every 4 hours PRN Severe Pain (7 - 10)  ketorolac   Injectable 15 milliGRAM(s) IV Push every 6 hours PRN Severe Pain (7 - 10)  ondansetron Injectable 4 milliGRAM(s) IV Push three times a day PRN Nausea and/or Vomiting    Singles Doses Administered  (ADM OVERRIDE) 1 each &lt;see task&gt; GiveOnce  (ADM OVERRIDE) 1 each &lt;see task&gt; GiveOnce  (ADM OVERRIDE) 1 each &lt;see task&gt; GiveOnce  (ADM OVERRIDE) 1 each &lt;see task&gt; GiveOnce  (ADM OVERRIDE) 1 each &lt;see task&gt; GiveOnce  acetaminophen   IVPB .. 1000 milliGRAM(s) IV Intermittent once  acetaminophen   IVPB .. 1000 milliGRAM(s) IV Intermittent Once  ampicillin/sulbactam  IVPB 3 Gram(s) IV Intermittent once  artificial tears (preservative free) Ophthalmic Solution 1 Drop(s) Both EYES once  benzocaine 20% Spray 1 Spray(s) Topical once  bisacodyl Suppository 10 milliGRAM(s) Rectal once  HYDROmorphone  Injectable 0.5 milliGRAM(s) IV Push once  HYDROmorphone  Injectable 0.2 milliGRAM(s) IV Push once  HYDROmorphone  Injectable 0.5 milliGRAM(s) IV Push once  HYDROmorphone  Injectable 0.5 milliGRAM(s) IV Push once  HYDROmorphone  Injectable 0.5 milliGRAM(s) IV Push once  HYDROmorphone  Injectable 0.5 milliGRAM(s) IV Push once  HYDROmorphone  Injectable 0.5 milliGRAM(s) IV Push once  iohexol 300 mG (iodine)/mL Oral Solution 30 milliLiter(s) Oral once  ketorolac   Injectable 15 milliGRAM(s) IV Push once  ketorolac   Injectable 30 milliGRAM(s) IV Push once  ketorolac   Injectable 15 milliGRAM(s) IV Push Once  ketorolac   Injectable 30 milliGRAM(s) IV Push once  ketorolac   Injectable 15 milliGRAM(s) IV Push once  lactated ringers Bolus 1000 milliLiter(s) IV Bolus once  lactulose Syrup 10 Gram(s) Oral every 1 hour  LORazepam     Tablet 1 milliGRAM(s) Oral once  methocarbamol 750 milliGRAM(s) Oral once  mineral oil enema 133 milliLiter(s) Rectal once  morphine  - Injectable 4 milliGRAM(s) IV Push Once  ondansetron Injectable 4 milliGRAM(s) IV Push Once  polyethylene glycol 3350 17 Gram(s) Oral once  saline laxative (FLEET) Rectal Enema 1 Enema Rectal once  saline laxative (FLEET) Rectal Enema 1 Enema Rectal once      
  HITESH WAYNE  39y, Male  Allergy: No Known Allergies      All historical available data reviewed.    HPI:  39 year old man with a past medical history of hypothyroidism and deafness with cochlear implants presented for severe diffuse abdominal pain of a few hours duration. Patient reported having diffuse abdominal pain, non radiating unbearable pain, that caused him to vomit a non-bilious non-bloody vomitus three times before presenting to the ED. Patient reported that the pain is unrelated to food intake. He has not had such pain before. He denied diarrhea, blood in stool, fever, chills, changes in urinary habits, sob, chest pain, headache, rashes or palpitations. 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 Patient traveled in August to [place] where reported drinking from tap water, eating meat and walked barefoot on the beach. He did not eat anything from outside yesterday besides a cake. He does not smoke, drink alcohol or uses drugs    in the ED: · BP Systolic	136 mm Hg BP Diastolic	88 mm Hg  Heart Rate	96 /min Respiration Rate (breaths/min)	18 /min Temp (C)	36.8 Degrees C    CT abdomen/pelvis with IV contrast: Focal segment of dilated distal small bowel loops within the lower pelvis with early fecalization. No discrete transition point. Nonspecific but   may reflect ileus or early obstruction    Ultrasound testicles: No sonographic evidence of testicular torsion. Small left hydrocele with approximate volume of 2 mL       (03 Oct 2024 23:43)    FAMILY HISTORY:    PAST MEDICAL & SURGICAL HISTORY:        VITALS:  T(F): 98.6, Max: 98.6 (10-07-24 @ 15:15)  HR: 87  BP: 114/82  RR: 18Vital Signs Last 24 Hrs  T(C): 37 (07 Oct 2024 15:15), Max: 37 (07 Oct 2024 15:15)  T(F): 98.6 (07 Oct 2024 15:15), Max: 98.6 (07 Oct 2024 15:15)  HR: 87 (07 Oct 2024 15:15) (85 - 87)  BP: 114/82 (07 Oct 2024 15:15) (114/82 - 126/83)  BP(mean): --  RR: 18 (07 Oct 2024 15:15) (18 - 18)  SpO2: 96% (07 Oct 2024 15:15) (96% - 97%)    Parameters below as of 06 Oct 2024 16:18  Patient On (Oxygen Delivery Method): room air        TESTS & MEASUREMENTS:                        16.0   9.48  )-----------( 321      ( 07 Oct 2024 08:15 )             45.6     10-07    138  |  98  |  15  ----------------------------<  77  3.8   |  23  |  0.9    Ca    9.5      07 Oct 2024 08:15  Mg     2.0     10-07    TPro  6.7  /  Alb  4.4  /  TBili  1.0  /  DBili  x   /  AST  13  /  ALT  10  /  AlkPhos  62  10-07    LIVER FUNCTIONS - ( 07 Oct 2024 08:15 )  Alb: 4.4 g/dL / Pro: 6.7 g/dL / ALK PHOS: 62 U/L / ALT: 10 U/L / AST: 13 U/L / GGT: x             Culture - Stool (collected 10-04-24 @ 18:01)  Source: .Stool  Final Report (10-07-24 @ 08:03):    No enteric pathogens isolated.    (Stool culture examined for Salmonella,    Shigella, Campylobacter, Aeromonas, Plesiomonas,    Vibrio, E.coli O157 and Yersinia)    Urinalysis with Rflx Culture (collected 10-03-24 @ 19:08)      Urinalysis Basic - ( 07 Oct 2024 08:15 )    Color: x / Appearance: x / SG: x / pH: x  Gluc: 77 mg/dL / Ketone: x  / Bili: x / Urobili: x   Blood: x / Protein: x / Nitrite: x   Leuk Esterase: x / RBC: x / WBC x   Sq Epi: x / Non Sq Epi: x / Bacteria: x          RADIOLOGY & ADDITIONAL TESTS:  Personal review of radiological diagnostics performed  Echo and EKG results noted when applicable.     MEDICATIONS:  ampicillin/sulbactam  IVPB      ampicillin/sulbactam  IVPB 3 Gram(s) IV Intermittent every 6 hours  bisacodyl Suppository 10 milliGRAM(s) Rectal daily  HYDROmorphone  Injectable 0.2 milliGRAM(s) IV Push every 4 hours PRN  influenza   Vaccine 0.5 milliLiter(s) IntraMuscular once  ketorolac   Injectable 15 milliGRAM(s) IV Push every 6 hours PRN  lactated ringers. 1000 milliLiter(s) IV Continuous <Continuous>  levothyroxine 125 MICROGram(s) Oral daily  melatonin 5 milliGRAM(s) Oral at bedtime  ondansetron Injectable 4 milliGRAM(s) IV Push three times a day PRN  pantoprazole  Injectable 40 milliGRAM(s) IV Push daily  polyethylene glycol 3350 17 Gram(s) Oral two times a day  senna 2 Tablet(s) Oral at bedtime  vancomycin  IVPB 1000 milliGRAM(s) IV Intermittent every 12 hours      ANTIBIOTICS:  ampicillin/sulbactam  IVPB 3 Gram(s) IV Intermittent every 6 hours  ampicillin/sulbactam  IVPB      vancomycin  IVPB 1000 milliGRAM(s) IV Intermittent every 12 hours

## 2024-10-07 NOTE — PROGRESS NOTE ADULT - SUBJECTIVE AND OBJECTIVE BOX
SUBJECTIVE/OVERNIGHT EVENTS  Today is hospital day 4d. This morning patient was seen and examined at bedside, presents with acute abdominal pain, was unable to pass Bowel movement s/p enema.    MEDICATIONS  STANDING MEDICATIONS  ampicillin/sulbactam  IVPB 3 Gram(s) IV Intermittent once  ampicillin/sulbactam  IVPB 3 Gram(s) IV Intermittent every 6 hours  ampicillin/sulbactam  IVPB      bisacodyl Suppository 10 milliGRAM(s) Rectal daily  influenza   Vaccine 0.5 milliLiter(s) IntraMuscular once  iohexol 300 mG (iodine)/mL Oral Solution 30 milliLiter(s) Oral once  lactated ringers. 1000 milliLiter(s) IV Continuous <Continuous>  levothyroxine 125 MICROGram(s) Oral daily  melatonin 5 milliGRAM(s) Oral at bedtime  pantoprazole  Injectable 40 milliGRAM(s) IV Push daily  polyethylene glycol 3350 17 Gram(s) Oral two times a day  senna 2 Tablet(s) Oral at bedtime  vancomycin  IVPB 1000 milliGRAM(s) IV Intermittent every 12 hours    PRN MEDICATIONS  HYDROmorphone  Injectable 0.2 milliGRAM(s) IV Push every 4 hours PRN  ketorolac   Injectable 15 milliGRAM(s) IV Push every 6 hours PRN  ondansetron Injectable 4 milliGRAM(s) IV Push three times a day PRN    VITALS  T(F): 97.6 (10-06-24 @ 16:18), Max: 97.6 (10-06-24 @ 16:18)  HR: 85 (10-06-24 @ 16:18) (85 - 85)  BP: 126/83 (10-06-24 @ 16:18) (126/83 - 126/83)  RR: 18 (10-06-24 @ 16:18) (18 - 18)  SpO2: 97% (10-06-24 @ 16:18) (97% - 97%)    PHYSICAL EXAM  GENERAL  ( x ) NAD, lying in bed comfortably     (  ) obtunded     (  ) lethargic     (  ) somnolent    HEAD  ( x ) Atraumatic     (  ) hematoma     (  ) laceration (specify location:       )     NECK  ( x ) Supple     (  ) neck stiffness     (  ) nuchal rigidity     (  )  no JVD     (  ) JVD present ( -- cm)    HEART  Rate -->  ( x ) normal rate    (  ) bradycardic    (  ) tachycardic  Rhythm -->  (x  ) regular    (  ) regularly irregular    (  ) irregularly irregular  Murmurs -->  ( x ) normal s1/s2    (  ) systolic murmur    (  ) diastolic murmur    (  ) continuous murmur     (  ) S3 present    (  ) S4 present    LUNGS  (  )Unlabored respirations     ( x ) tachypnea  (x  ) B/L air entry     (  ) decreased breath sounds in:  (location     )    ( x ) no adventitious sound     (  ) crackles     (  ) wheezing      (  ) rhonchi      (specify location:       )  (  ) chest wall tenderness (specify location:       )    ABDOMEN  (x  ) Soft     (  ) tense   |   (  ) nondistended     ( x ) distended   |   ( x ) +BS     (  ) hypoactive bowel sounds     (  ) hyperactive bowel sounds  (  ) nontender     (  ) RUQ tenderness     (  ) RLQ tenderness     ( x ) LLQ tenderness     (  ) epigastric tenderness     (  ) diffuse tenderness  (  ) Splenomegaly      (  ) Hepatomegaly      (  ) Jaundice     (  ) ecchymosis     EXTREMITIES  ( x ) Normal     (  ) Rash     (  ) ecchymosis     (  ) varicose veins      (  ) pitting edema     (  ) non-pitting edema   (  ) ulceration     (  ) gangrene:     (location:     )    NERVOUS SYSTEM  ( x ) A&Ox3     (  ) confused     (  ) lethargic  CN II-XII:     (  ) Intact     (  ) focal deficits  (Specify:     )   Upper extremities:     (  ) strength X/5     (  ) focal deficit (specify:    )  Lower extremities:     (  ) strength  X/5    (  ) focal deficit (specify:    )    SKIN  (x  ) No rashes or lesions     (  ) maculopapular rash     (  ) pustules     (  ) vesicles     (  ) ulcer     (  ) ecchymosis     (specify location:     )    (  ) Indwelling Andrews Catheter   Date insterted:    Reason (  ) Critical illness     (  ) urinary retention    (  ) Accurate Ins/Outs Monitoring     (  ) CMO patient    (  ) Central Line  Date inserted:  Location: (  ) Right IJ   (  ) Left IJ   (  ) Right Fem   (  ) Left Fem    (  ) SPC  (  ) pigtail  (  ) PEG tube  (  ) colostomy  (  ) jejunostomy  (  ) U-Dall    LABS             16.0   9.48  )-----------( 321      ( 10-07-24 @ 08:15 )             45.6     138  |  98  |  15  -------------------------<  77   10-07-24 @ 08:15  3.8  |  23  |  0.9    Ca      9.5     10-07-24 @ 08:15  Mg     2.0     10-07-24 @ 08:15    TPro  6.7  /  Alb  4.4  /  TBili  1.0  /  DBili  x   /  AST  13  /  ALT  10  /  AlkPhos  62  /  GGT  x     10-07-24 @ 08:15    PT/INR - ( 10-07-24 @ 08:15 )   PT: 12.60 sec;   INR: 1.10 ratio  PTT - ( 10-07-24 @ 08:15 )  PTT:43.4 sec      Urinalysis Basic - ( 07 Oct 2024 08:15 )    Color: x / Appearance: x / SG: x / pH: x  Gluc: 77 mg/dL / Ketone: x  / Bili: x / Urobili: x   Blood: x / Protein: x / Nitrite: x   Leuk Esterase: x / RBC: x / WBC x   Sq Epi: x / Non Sq Epi: x / Bacteria: x          Culture - Stool (collected 04 Oct 2024 18:01)  Source: .Stool  Final Report (07 Oct 2024 08:03):    No enteric pathogens isolated.    (Stool culture examined for Salmonella,    Shigella, Campylobacter, Aeromonas, Plesiomonas,    Vibrio, E.coli O157 and Yersinia)      IMAGING SUBJECTIVE/OVERNIGHT EVENTS  Today is hospital day 4d. This morning patient was seen and examined at bedside, presents with acute abdominal pain, was unable to pass Bowel movement s/p enema.    MEDICATIONS  STANDING MEDICATIONS  ampicillin/sulbactam  IVPB 3 Gram(s) IV Intermittent once  ampicillin/sulbactam  IVPB 3 Gram(s) IV Intermittent every 6 hours  ampicillin/sulbactam  IVPB      bisacodyl Suppository 10 milliGRAM(s) Rectal daily  influenza   Vaccine 0.5 milliLiter(s) IntraMuscular once  iohexol 300 mG (iodine)/mL Oral Solution 30 milliLiter(s) Oral once  lactated ringers. 1000 milliLiter(s) IV Continuous <Continuous>  levothyroxine 125 MICROGram(s) Oral daily  melatonin 5 milliGRAM(s) Oral at bedtime  pantoprazole  Injectable 40 milliGRAM(s) IV Push daily  polyethylene glycol 3350 17 Gram(s) Oral two times a day  senna 2 Tablet(s) Oral at bedtime  vancomycin  IVPB 1000 milliGRAM(s) IV Intermittent every 12 hours    PRN MEDICATIONS  HYDROmorphone  Injectable 0.2 milliGRAM(s) IV Push every 4 hours PRN  ketorolac   Injectable 15 milliGRAM(s) IV Push every 6 hours PRN  ondansetron Injectable 4 milliGRAM(s) IV Push three times a day PRN    VITALS  T(F): 97.6 (10-06-24 @ 16:18), Max: 97.6 (10-06-24 @ 16:18)  HR: 85 (10-06-24 @ 16:18) (85 - 85)  BP: 126/83 (10-06-24 @ 16:18) (126/83 - 126/83)  RR: 18 (10-06-24 @ 16:18) (18 - 18)  SpO2: 97% (10-06-24 @ 16:18) (97% - 97%)    PHYSICAL EXAM  GENERAL  ( x ) NAD, lying in bed comfortably     (  ) obtunded     (  ) lethargic     (  ) somnolent    HEAD  ( x ) Atraumatic     (  ) hematoma     (  ) laceration (specify location:       )     NECK  ( x ) Supple     (  ) neck stiffness     (  ) nuchal rigidity     (  )  no JVD     (  ) JVD present ( -- cm)    HEART  Rate -->  ( x ) normal rate    (  ) bradycardic    (  ) tachycardic  Rhythm -->  (x  ) regular    (  ) regularly irregular    (  ) irregularly irregular  Murmurs -->  ( x ) normal s1/s2    (  ) systolic murmur    (  ) diastolic murmur    (  ) continuous murmur     (  ) S3 present    (  ) S4 present    LUNGS  (  )Unlabored respirations     ( x ) tachypnea  (x  ) B/L air entry     (  ) decreased breath sounds in:  (location     )    ( x ) no adventitious sound     (  ) crackles     (  ) wheezing      (  ) rhonchi      (specify location:       )  (  ) chest wall tenderness (specify location:       )    ABDOMEN  (x  ) Soft     (  ) tense   |   (  ) nondistended     ( x ) distended   |   ( x ) +BS     (  ) hypoactive bowel sounds     (  ) hyperactive bowel sounds  (  ) nontender     (  ) RUQ tenderness     (  ) RLQ tenderness     ( x ) LLQ tenderness     (  ) epigastric tenderness     (  ) diffuse tenderness  (  ) Splenomegaly      (  ) Hepatomegaly      (  ) Jaundice     (  ) ecchymosis     EXTREMITIES  ( x ) Normal     (  ) Rash     (  ) ecchymosis     (  ) varicose veins      (  ) pitting edema     (  ) non-pitting edema   (  ) ulceration     (  ) gangrene:     (location:     )    NERVOUS SYSTEM  ( x ) A&Ox3     (  ) confused     (  ) lethargic  CN II-XII:     (  ) Intact     (  ) focal deficits  (Specify:     )   Upper extremities:     (  ) strength X/5     (  ) focal deficit (specify:    )  Lower extremities:     (  ) strength  X/5    (  ) focal deficit (specify:    )    SKIN  (x  ) No rashes or lesions     (  ) maculopapular rash     (  ) pustules     (  ) vesicles     (  ) ulcer     (  ) ecchymosis     (specify location:     )    (  ) Indwelling Andrews Catheter   Date insterted:    Reason (  ) Critical illness     (  ) urinary retention    (  ) Accurate Ins/Outs Monitoring     (  ) CMO patient    (  ) Central Line  Date inserted:  Location: (  ) Right IJ   (  ) Left IJ   (  ) Right Fem   (  ) Left Fem    (  ) SPC  (  ) pigtail  (  ) PEG tube  (  ) colostomy  (  ) jejunostomy  (  ) U-Dall    LABS             16.0   9.48  )-----------( 321      ( 10-07-24 @ 08:15 )             45.6     138  |  98  |  15  -------------------------<  77   10-07-24 @ 08:15  3.8  |  23  |  0.9    Ca      9.5     10-07-24 @ 08:15  Mg     2.0     10-07-24 @ 08:15    TPro  6.7  /  Alb  4.4  /  TBili  1.0  /  DBili  x   /  AST  13  /  ALT  10  /  AlkPhos  62  /  GGT  x     10-07-24 @ 08:15    PT/INR - ( 10-07-24 @ 08:15 )   PT: 12.60 sec;   INR: 1.10 ratio  PTT - ( 10-07-24 @ 08:15 )  PTT:43.4 sec      Urinalysis Basic - ( 07 Oct 2024 08:15 )    Color: x / Appearance: x / SG: x / pH: x  Gluc: 77 mg/dL / Ketone: x  / Bili: x / Urobili: x   Blood: x / Protein: x / Nitrite: x   Leuk Esterase: x / RBC: x / WBC x   Sq Epi: x / Non Sq Epi: x / Bacteria: x          Culture - Stool (collected 04 Oct 2024 18:01)  Source: .Stool  Final Report (07 Oct 2024 08:03):    No enteric pathogens isolated.    (Stool culture examined for Salmonella,    Shigella, Campylobacter, Aeromonas, Plesiomonas,    Vibrio, E.coli O157 and Yersinia)      IMAGING    Latest CT   PROCEDURE DATE:  10/06/2024          INTERPRETATION:  CLINICAL STATEMENT: Abdominal pain. Nausea. Vomiting.    TECHNIQUE: Contiguous axial CT images were obtained from the lower chest   to the pubic symphysis following administration of 95 cc Omnipaque 350   intravenous contrast.  Oral contrast was not administered.  Reformatted   images in the coronal and sagittal planes were acquired. 5 cc contrast   discarded.    Comparison made with CT abdomen and pelvis 10/3/2024.    FINDINGS:    LOWER CHEST: Unremarkable.    HEPATOBILIARY: Unchanged.    SPLEEN: Unremarkable.    PANCREAS: Unremarkable.    ADRENAL GLANDS: Unremarkable.    KIDNEYS: No hydronephrosis.    ABDOMINOPELVIC NODES: Unremarkable.    PELVIC ORGANS: Unremarkable.    PERITONEUM/MESENTERY/BOWEL: Increased extent of multiple loops of mildly   distended small bowel, dilated up to 3.2 cm with transition to collapsed   bowel within right lower abdomen (series 4, image 249). Mild ascites.   Normal appendix. No pneumoperitoneum.    BONES/SOFT TISSUES: Stable osseous structures. New subcutaneous gas   within right medial gluteal fold and ischioanal fossa without any   noticeable inflammatory change, of indeterminate clinical significance; a   necrotizing infection is a consideration in the appropriate clinical   setting (series 5, image [id number]).      IMPRESSION:  Since 10/3/2024:    Increased extent of multiple loops of mildly distended small bowel,   dilated up to 3.2 cm with transition to collapsed bowel within right   lower abdomen. Small bowel obstruction suspected.    New subcutaneous gas within right medial gluteal fold and ischioanal   fossa without any noticeable inflammatory change, of indeterminate   clinical significance; a necrotizing infection is a consideration in the   appropriate clinical setting.    Spoke with Dr. Canales

## 2024-10-07 NOTE — CONSULT NOTE ADULT - ASSESSMENT
Assessment and Plan  Case of a 39 year old male patient with history of hypothyroidim and deafness s/p cochlear implants who presented to the ED on 10/03 for evaluation of diffuse abdominal pain associated with nausea and vomiting, found to have mild distention of ileum upto 4.4cm and colon stool burden with concern for ileus/early SBO on imaging. We are consulted in setting of ileus versus early SBO.      Periumbilical Pain with Nausea, Vomiting, and constipation  Gaseous Distention of SB/Ileum upto 4.4cm on 10/06 with Moderate Colon Stool [place]  Suspect SBO; Rule Out Ileus VS Impaction  No Evidence of Anemia  * Presented on 10/03 for diffuse abdominal pain with nausea and non bilious non bloody vomiting; constipation; no unintentional weight loss or melena or hematochezia; uses ibuprofen few times per month (last time was 2 weeks ago)  * Last BM was on 10/03; last flatus was on 10/03; vomited 4 times from 10/03-10/05  * Vitals: 136/88mmHg, HR 96 bpm, T 36.8 degrees  * Labs: WBC 9.8 on 10/03 -> 11.9 on 10/05  * Hb trend: 15.6 on 10/03 -> 16.1 on 10/05  * No INR in chart; LA 1 on 10/04  * CT Abdomen and Pelvis w/ IV Cont (10.03.24 @ 19:27) Focal segment of dilated distal small bowel loops within the lower pelvis with early fecalization. No discrete transition point. Nonspecific but may reflect ileus or early obstruction  * XR abdomen 10/04 x2 moderate fecal load without bowel dilation -> repeat on 10/04 PM: air filled loops of dilated SB; slightly increased; unchanged moderate colon stool burden  * Repeat KUB 10/05 gaseous distention of SB  * Repeat KUB 10/06 stable dilated SB loops to 4.4cm; stool in colon; cannot exclude SBO -> repeat KUB on 10/06: redemonstrated SBO  * Repeat CT abdomen pelvis IC on 10/06: Increased extent of multiple loops of mildly distended small bowel, dilated up to 3.2 cm with transition to collapsed bowel within right lower abdomen. Small bowel obstruction suspected.  * FHx of IBS but no IBD or GI cancers  * No previous EGD or colonoscopy    RECOMMENDATIONS  - In the setting of no BM or passage of flatus since 10/03 and in setting of concern for SBO on CT 10/06, would recommend STAT CT abdomen with PO contrast to check for SBO. Would also recommend STAT Surgery follow up (was on board on 10/03 with no surgical intervention planned)  - Recommend keeping patient NPO and placing NG STAT to low intermittent suction. Continue IV fluid hydration with LR at 100mL/hour  - Trend electrolytes and replete as indicated. Target K >4, Mg >2, PO4 >1. Repeat LA. Check ESR and CRP  - Recommend serial abdominal exams. Check daily KUBs  - Avoid Anticholingerics and Calcium channel blockers  - Monitor for pain: avoid NSAIDs (On IV toradol PRN; received 15mg x1 on 10/03 then x1 on 10/05). Limit opioids (currently on IV HM PRN; received IV HM 0.5mg x1 on 10/03 then x2 on 10/04 then 0.2mg x1 on 10/06)  - Monitor nausea: continue IV Zofran 4mg Q8h PRN if QTc is within normal  - Monitor BM and flatus. Avoid constipation. In the setting of suspected SBO, would discontinue miralax 17g BID and senna 2 tabs at bedtime; on dulcolax 10mg rectal suppository daily. s/p fleet enemas from 10/04-10/06. In case of diarrhea, would check stool culture, GI PCR, stool ova/parasites, clostridium difficile  - Would switch to IV Protonix 40mg BID for now  - Monitor MAP and keep > 65mmHg  - Get STAT supine and upright abdominal plain film for any signs of perforation and call Surgery  - Would benefit from outpatient follow up with our [facility] for EGD and colonoscopy pending medical optimization (located at [address] [place] [address]. Phone Number: [phone])  - Follow up with our [facility] located at [address] [place] [address]. Phone Number: [phone]        Right Hepatic Lobe Subcm Hypodensity with Additional Sucm Hypodensities  * Noted on CT abdomen pelvis IC 10/03  * No previous US abdomen  * No previous acute hepatitis panel  * LFTs 0.8/52/12/13 on 10/05  * No FHx of liver diseases    RECOMMENDATIONS  - Trend LFTs  - Check acute hepatitis panel  - Check RUQ US abdomen  - Avoid hepatotoxic agents including alcohol  - Follow up with our [facility] located at [address] [place] [address], [address]. Telephone No: [phone]      Thank you for your consult.  - Please note that plan was communicated with medical team.   - Please reach GI on [phone] during weekdays till 5pm.  - Please call the GI service line after 5pm on Weekdays and anytime on Weekends: [phone].      Dimitri Prajapati MD  PGY - 5 Gastroenterology Fellow   [facility]   Assessment and Plan  Case of a 39 year old male patient with history of hypothyroidim and deafness s/p cochlear implants who presented to the ED on 10/03 for evaluation of diffuse abdominal pain associated with nausea and vomiting, found to have mild distention of ileum upto 4.4cm and colon stool burden with concern for ileus/early SBO on imaging. We are consulted in setting of ileus versus early SBO.      Periumbilical Pain with Nausea, Vomiting, and constipation  Gaseous Distention of SB/Ileum upto 4.4cm on 10/06 with Moderate Colon Stool [place]  Suspect SBO; Rule Out Ileus VS Impaction  No Evidence of Anemia  * Presented on 10/03 for diffuse abdominal pain with nausea and non bilious non bloody vomiting; constipation; no unintentional weight loss or melena or hematochezia; uses ibuprofen few times per month (last time was 2 weeks ago)  * Last BM was on 10/03; last flatus was on 10/03; vomited 4 times from 10/03-10/05  * Vitals: 136/88mmHg, HR 96 bpm, T 36.8 degrees  * Labs: WBC 9.8 on 10/03 -> 11.9 on 10/05  * Hb trend: 15.6 on 10/03 -> 16.1 on 10/05  * No INR in chart; LA 1 on 10/04  * CT Abdomen and Pelvis w/ IV Cont (10.03.24 @ 19:27) Focal segment of dilated distal small bowel loops within the lower pelvis with early fecalization. No discrete transition point. Nonspecific but may reflect ileus or early obstruction  * XR abdomen 10/04 x2 moderate fecal load without bowel dilation -> repeat on 10/04 PM: air filled loops of dilated SB; slightly increased; unchanged moderate colon stool burden  * Repeat KUB 10/05 gaseous distention of SB  * Repeat KUB 10/06 stable dilated SB loops to 4.4cm; stool in colon; cannot exclude SBO -> repeat KUB on 10/06: redemonstrated SBO  * Repeat CT abdomen pelvis IC on 10/06: Increased extent of multiple loops of mildly distended small bowel, dilated up to 3.2 cm with transition to collapsed bowel within right lower abdomen. Small bowel obstruction suspected.  * FHx of IBS but no IBD or GI cancers  * No previous EGD or colonoscopy    RECOMMENDATIONS  - In the setting of no BM or passage of flatus since 10/03 and in setting of concern for SBO on CT 10/06, recommended STAT CT abdomen with PO contrast to check for SBO (discussed with hospitalise at 7:10 AM on 10/07). Also recommended STAT Surgery follow up at 7:10 AM on 10/07 (was on board on 10/03 with no surgical intervention planned)  - Recommend keeping patient NPO and placing NG STAT to low intermittent suction (recommended at 7:10 AM on 10/07). Continue IV fluid hydration with LR at 100mL/hour  - Trend electrolytes and replete as indicated. Target K >4, Mg >2, PO4 >1. Repeat LA. Check ESR and CRP  - Recommend serial abdominal exams. Check daily KUBs  - Avoid Anticholingerics and Calcium channel blockers  - Monitor for pain: avoid NSAIDs (On IV toradol PRN; received 15mg x1 on 10/03 then x1 on 10/05). Limit opioids (currently on IV HM PRN; received IV HM 0.5mg x1 on 10/03 then x2 on 10/04 then 0.2mg x1 on 10/06)  - Monitor nausea: continue IV Zofran 4mg Q8h PRN if QTc is within normal  - Monitor BM and flatus. Avoid constipation. In the setting of suspected SBO, would discontinue miralax 17g BID and senna 2 tabs at bedtime; on dulcolax 10mg rectal suppository daily. s/p fleet enemas from 10/04-10/06. In case of diarrhea, would check stool culture, GI PCR, stool ova/parasites, clostridium difficile  - Would switch to IV Protonix 40mg BID for now  - Monitor MAP and keep > 65mmHg  - Get STAT supine and upright abdominal plain film for any signs of perforation and call Surgery  - Would benefit from outpatient follow up with our [facility] for EGD and colonoscopy pending medical optimization (located at [address] [place] [address]. Phone Number: [phone])  - Follow up with our [facility] located at [address] [place] [address]. Phone Number: [phone]        Right Hepatic Lobe Subcm Hypodensity with Additional Sucm Hypodensities  * Noted on CT abdomen pelvis IC 10/03  * No previous US abdomen  * No previous acute hepatitis panel  * LFTs 0.8/52/12/13 on 10/05  * No FHx of liver diseases    RECOMMENDATIONS  - Trend LFTs  - Check acute hepatitis panel  - Check RUQ US abdomen  - Avoid hepatotoxic agents including alcohol  - Follow up with our [facility] located at [address] [place] [address], [address]. Telephone No: [phone]      Thank you for your consult.  - Please note that plan was communicated with medical team.   - Please reach GI on [phone] during weekdays till 5pm.  - Please call the GI service line after 5pm on Weekdays and anytime on Weekends: [phone].      Dimitri Prajapati MD  PGY - 5 Gastroenterology Fellow   [facility]   Assessment and Plan  Case of a 39 year old male patient with history of hypothyroidism and deafness s/p cochlear implants who presented to the ED on 10/03 for evaluation of diffuse abdominal pain associated with nausea and vomiting, found to have mild distention of ileum upto 4.4cm and colon stool burden with concern for ileus/early SBO on imaging. We are consulted in setting of ileus versus early SBO.      Periumbilical Pain with Nausea, Vomiting, and constipation  Gaseous Distention of SB/Ileum upto 4.4cm on 10/06 with Moderate Colon Stool [place]  Suspect SBO; Rule Out Ileus VS Impaction  No Evidence of Anemia  * Presented on 10/03 for diffuse abdominal pain with nausea and non bilious non bloody vomiting; constipation; no unintentional weight loss or melena or hematochezia; uses ibuprofen few times per month (last time was 2 weeks ago)  * Last BM was on 10/03; last flatus was on 10/03; vomited 4 times from 10/03-10/05  * Vitals: 136/88mmHg, HR 96 bpm, T 36.8 degrees  * Labs: WBC 9.8 on 10/03 -> 11.9 on 10/05  * Hb trend: 15.6 on 10/03 -> 16.1 on 10/05  * No INR in chart; LA 1 on 10/04  * CT Abdomen and Pelvis w/ IV Cont (10.03.24 @ 19:27) Focal segment of dilated distal small bowel loops within the lower pelvis with early fecalization. No discrete transition point. Nonspecific but may reflect ileus or early obstruction  * XR abdomen 10/04 x2 moderate fecal load without bowel dilation -> repeat on 10/04 PM: air filled loops of dilated SB; slightly increased; unchanged moderate colon stool burden  * Repeat KUB 10/05 gaseous distention of SB  * Repeat KUB 10/06 stable dilated SB loops to 4.4cm; stool in colon; cannot exclude SBO -> repeat KUB on 10/06: redemonstrated SBO  * Repeat CT abdomen pelvis IC on 10/06: Increased extent of multiple loops of mildly distended small bowel, dilated up to 3.2 cm with transition to collapsed bowel within right lower abdomen. Small bowel obstruction suspected.  * FHx of IBS but no IBD or GI cancers  * No previous EGD or colonoscopy    RECOMMENDATIONS  - In the setting of no BM or passage of flatus since 10/03 and in setting of concern for SBO on CT 10/06, recommended STAT CT abdomen with PO contrast to check for SBO (discussed with hospitalist at 7:10 AM on 10/07). Also recommended STAT Surgery follow up at 7:10 AM on 10/07 (was on board on 10/03 with no surgical intervention planned)  - Recommend keeping patient NPO and placing NG STAT to low intermittent suction (recommended at 7:10 AM on 10/07). Continue IV fluid hydration with LR at 100mL/hour  - Trend electrolytes and replete as indicated. Target K >4, Mg >2, PO4 >1. Repeat LA. Check ESR and CRP  - Recommend serial abdominal exams. Check daily KUBs  - Avoid Anticholingerics and Calcium channel blockers  - Monitor for pain: avoid NSAIDs (On IV toradol PRN; received 15mg x1 on 10/03 then x1 on 10/05). Limit opioids (currently on IV HM PRN; received IV HM 0.5mg x1 on 10/03 then x2 on 10/04 then 0.2mg x1 on 10/06)  - Monitor nausea: continue IV Zofran 4mg Q8h PRN if QTc is within normal  - Monitor BM and flatus. Avoid constipation. In the setting of suspected SBO, would discontinue miralax 17g BID and senna 2 tabs at bedtime; on dulcolax 10mg rectal suppository daily. s/p fleet enemas from 10/04-10/06. In case of diarrhea, would check stool culture, GI PCR, stool ova/parasites, clostridium difficile  - Would switch to IV Protonix 40mg BID for now  - Monitor MAP and keep > 65mmHg  - Get STAT supine and upright abdominal plain film for any signs of perforation and call Surgery  - Would benefit from outpatient follow up with our [facility] for EGD and colonoscopy pending medical optimization (located at [address] [place] [address]. Phone Number: [phone])  - Follow up with our [facility] located at [address] [place] [address]. Phone Number: [phone]        Right Hepatic Lobe Subcm Hypodensity with Additional Sucm Hypodensities  * Noted on CT abdomen pelvis IC 10/03  * No previous US abdomen  * No previous acute hepatitis panel  * LFTs 0.8/52/12/13 on 10/05  * No FHx of liver diseases    RECOMMENDATIONS  - Trend LFTs  - Check acute hepatitis panel  - Check RUQ US abdomen  - Avoid hepatotoxic agents including alcohol  - Follow up with our [facility] located at [address] [place] [address], [address]. Telephone No: [phone]      Thank you for your consult.  - Please note that plan was communicated with medical team.   - Please reach GI on [phone] during weekdays till 5pm.  - Please call the GI service line after 5pm on Weekdays and anytime on Weekends: [phone].      Dimitri Prajapati MD  PGY - 5 Gastroenterology Fellow   [facility]

## 2024-10-07 NOTE — PROGRESS NOTE ADULT - ASSESSMENT
39 year old man with a past medical history of hypothyroidism and deafness with cochlear implants presented for severe diffuse abdominal pain of a few hours duration.    #Severe diffuse abdominal pain. with suspicion of bowel obstruction complicated by gluteal gas   - Started on Unasyn and Vanco  - Will get CT AP with Oral Contrast  - Surgery aware and awaiting plan  - NGT placed for relief    #Hypothyroidism  - Continue levothyroxine 125 mcg    #Deafness with cochlear implants     #Insomnia melatonin     PROGRESS NOTE HANDOFF    Pending: surgery follow up  , antibiotics, clinical improvement       Disposition: Home

## 2024-10-07 NOTE — PROGRESS NOTE ADULT - SUBJECTIVE AND OBJECTIVE BOX
GENERAL SURGERY PROGRESS NOTE    Patient: HITESH WAYNE , 39y ([birth year])Male   MRN: [id number]  Location: [address]  Visit: 10-03-24 Inpatient  Date: 10-07-24 @ 14:23    Hospital Day #: 4  Post-Op Day #:     Procedure/Dx/Injuries: SBO    Events of past 24 hours: Patient not having bowel function. Not tolerating PO. Getting enemas. Pain continued all weekend. NGT placed by medical team this morning with minimal to no output.     PAST MEDICAL & SURGICAL HISTORY:      Vitals:   T(F): 97.6 (10-06-24 @ 16:18), Max: 97.6 (10-06-24 @ 16:18)  HR: 85 (10-06-24 @ 16:18)  BP: 126/83 (10-06-24 @ 16:18)  RR: 18 (10-06-24 @ 16:18)  SpO2: 97% (10-06-24 @ 16:18)      Diet, NPO:   Except Medications      Fluids:     I & O's:    10-06-24 @ 07:01  -  10-07-24 @ 07:00  --------------------------------------------------------  IN:    IV PiggyBack: 100 mL    Lactated Ringers: 1200 mL  Total IN: 1300 mL    OUT:  Total OUT: 0 mL    Total NET: 1300 mL      PHYSICAL EXAM:  General: NAD, AAOx3, calm and cooperative  HEENT: NCAT, FALLON, EOMI, Trachea ML, Neck supple  Cardiac: RRR S1, S2, no Murmurs, rubs or gallops  Respiratory: CTAB, normal respiratory effort  Abdomen: Soft, non-distended, mild umbilical tenderness, NGT in place   Rectal: EFREN with brown stool, no blood  Skin: Warm/dry, normal color, no jaundice      MEDICATIONS  (STANDING):  ampicillin/sulbactam  IVPB 3 Gram(s) IV Intermittent every 6 hours  ampicillin/sulbactam  IVPB      bisacodyl Suppository 10 milliGRAM(s) Rectal daily  influenza   Vaccine 0.5 milliLiter(s) IntraMuscular once  lactated ringers. 1000 milliLiter(s) (100 mL/Hr) IV Continuous <Continuous>  levothyroxine 125 MICROGram(s) Oral daily  melatonin 5 milliGRAM(s) Oral at bedtime  pantoprazole  Injectable 40 milliGRAM(s) IV Push daily  polyethylene glycol 3350 17 Gram(s) Oral two times a day  senna 2 Tablet(s) Oral at bedtime  vancomycin  IVPB 1000 milliGRAM(s) IV Intermittent every 12 hours    MEDICATIONS  (PRN):  HYDROmorphone  Injectable 0.2 milliGRAM(s) IV Push every 4 hours PRN Severe Pain (7 - 10)  ketorolac   Injectable 15 milliGRAM(s) IV Push every 6 hours PRN Severe Pain (7 - 10)  ondansetron Injectable 4 milliGRAM(s) IV Push three times a day PRN Nausea and/or Vomiting      DVT PROPHYLAXIS:   GI PROPHYLAXIS: pantoprazole  Injectable 40 milliGRAM(s) IV Push daily    ANTICOAGULATION:   ANTIBIOTICS:  ampicillin/sulbactam  IVPB 3 Gram(s)  ampicillin/sulbactam  IVPB    vancomycin  IVPB 1000 milliGRAM(s)    LAB/STUDIES:  Labs:  CAPILLARY BLOOD GLUCOSE                              16.0   9.48  )-----------( 321      ( 07 Oct 2024 08:15 )             45.6       Auto Neutrophil %: 73.8 % (10-07-24 @ 08:15)  Auto Immature Granulocyte %: 0.3 % (10-07-24 @ 08:15)    10-07    138  |  98  |  15  ----------------------------<  77  3.8   |  23  |  0.9      Calcium: 9.5 mg/dL (10-07-24 @ 08:15)      LFTs:             6.7  | 1.0  | 13       ------------------[62      ( 07 Oct 2024 08:15 )  4.4  | x    | 10          Lipase:x      Amylase:x             Coags:     13.60  ----< 1.19    ( 07 Oct 2024 11:53 )     30.8          Urinalysis Basic - ( 07 Oct 2024 08:15 )    Color: x / Appearance: x / SG: x / pH: x  Gluc: 77 mg/dL / Ketone: x  / Bili: x / Urobili: x   Blood: x / Protein: x / Nitrite: x   Leuk Esterase: x / RBC: x / WBC x   Sq Epi: x / Non Sq Epi: x / Bacteria: x    Culture - Stool (collected 04 Oct 2024 18:01)  Source: .Stool  Final Report (07 Oct 2024 08:03):    No enteric pathogens isolated.    (Stool culture examined for Salmonella,    Shigella, Campylobacter, Aeromonas, Plesiomonas,    Vibrio, E.coli O157 and Yersinia)      IMAGING:  < from: CT Abdomen and Pelvis w/ IV Cont (10.06.24 @ 21:47) >  Increased extent of multiple loops of mildly distended small bowel,   dilated up to 3.2 cm with transition to collapsed bowel within right   lower abdomen. Small bowel obstruction suspected.    New subcutaneous gas within right medial gluteal fold and ischioanal   fossa without any noticeable inflammatory change, of indeterminate   clinical significance; a necrotizing infection is a consideration in the   appropriate clinical setting.    Spoke with Dr. Canales    < end of copied text >

## 2024-10-07 NOTE — PROGRESS NOTE ADULT - ASSESSMENT
39 year old man with a past medical history of hypothyroidism and deafness with cochlear implants presented for severe diffuse abdominal pain of a few hours duration.    #Severe diffuse abdominal pain. with suspicion of bowel obstruction complicated by gluteal gas   start unasyn and vancomycin   surgery aware of findings   stat labs   oral contrast   npo     #Hypothyroidism   levothyroxine 125 mcg    #Deafness with cochlear implants     #Insomnia melatonin     PROGRESS NOTE HANDOFF    Pending: surgery follow up  , antibiotics, clinical improvement     Family discussion: patient verbalized understanding and agreeable to plan of care     Disposition: Home

## 2024-10-07 NOTE — PROGRESS NOTE ADULT - SUBJECTIVE AND OBJECTIVE BOX
SUBJECTIVE/OVERNIGHT EVENTS  Today is hospital day 4d. This morning patient was seen and examined at bedside, resting comfortably in bed. No acute or major events overnight.    MEDICATIONS  STANDING MEDICATIONS  ampicillin/sulbactam  IVPB 3 Gram(s) IV Intermittent every 6 hours  ampicillin/sulbactam  IVPB      bisacodyl Suppository 10 milliGRAM(s) Rectal daily  influenza   Vaccine 0.5 milliLiter(s) IntraMuscular once  lactated ringers. 1000 milliLiter(s) IV Continuous <Continuous>  levothyroxine 125 MICROGram(s) Oral daily  melatonin 5 milliGRAM(s) Oral at bedtime  pantoprazole  Injectable 40 milliGRAM(s) IV Push daily  polyethylene glycol 3350 17 Gram(s) Oral two times a day  senna 2 Tablet(s) Oral at bedtime  vancomycin  IVPB 1000 milliGRAM(s) IV Intermittent every 12 hours    PRN MEDICATIONS  HYDROmorphone  Injectable 0.2 milliGRAM(s) IV Push every 4 hours PRN  ketorolac   Injectable 15 milliGRAM(s) IV Push every 6 hours PRN  ondansetron Injectable 4 milliGRAM(s) IV Push three times a day PRN    VITALS  T(F): 97.6 (10-06-24 @ 16:18), Max: 97.6 (10-06-24 @ 16:18)  HR: 85 (10-06-24 @ 16:18) (85 - 85)  BP: 126/83 (10-06-24 @ 16:18) (126/83 - 126/83)  RR: 18 (10-06-24 @ 16:18) (18 - 18)  SpO2: 97% (10-06-24 @ 16:18) (97% - 97%)    PHYSICAL EXAM  GENERAL: NAD  HEART: RRR  LUNGS: Normal b/l air entry  ABDOMEN: Very tender, no guarding  EXTREMITIES: Normal, no edema, strong peripheral pulses  NERVOUS SYSTEM: AO4  SKIN: Warm and dry, intact    LABS             16.0   9.48  )-----------( 321      ( 10-07-24 @ 08:15 )             45.6     138  |  98  |  15  -------------------------<  77   10-07-24 @ 08:15  3.8  |  23  |  0.9    Ca      9.5     10-07-24 @ 08:15  Mg     2.0     10-07-24 @ 08:15    TPro  6.7  /  Alb  4.4  /  TBili  1.0  /  DBili  x   /  AST  13  /  ALT  10  /  AlkPhos  62  /  GGT  x     10-07-24 @ 08:15    PT/INR - ( 10-07-24 @ 11:53 )   PT: 13.60 sec[H];   INR: 1.19 ratio  PTT - ( 10-07-24 @ 11:53 )  PTT:30.8 sec      Urinalysis Basic - ( 07 Oct 2024 08:15 )    Color: x / Appearance: x / SG: x / pH: x  Gluc: 77 mg/dL / Ketone: x  / Bili: x / Urobili: x   Blood: x / Protein: x / Nitrite: x   Leuk Esterase: x / RBC: x / WBC x   Sq Epi: x / Non Sq Epi: x / Bacteria: x          Culture - Stool (collected 04 Oct 2024 18:01)  Source: .Stool  Final Report (07 Oct 2024 08:03):    No enteric pathogens isolated.    (Stool culture examined for Salmonella,    Shigella, Campylobacter, Aeromonas, Plesiomonas,    Vibrio, E.coli O157 and Yersinia)      IMAGING

## 2024-10-07 NOTE — PROGRESS NOTE ADULT - ASSESSMENT
39 year old man with a past medical history of hypothyroidism and deafness with cochlear implants presented for severe diffuse abdominal pain of a few hours duration.    #Severe diffuse abdominal pain. No guarding or rebound tenderness  Latest ct 10/6 shows distention and possible gas, potential sbo    Enema were not able to relieve pain- patient reports he has not passed bowel.   NG Tube placed to decompress  Patient receiving PO contrast- Repeat CT Abdomen Pending   surgery consult pending    #Hypothyroidism   levothyroxine 125 mcg    #Deafness with cochlear implants     #Insomnia melatonin     #ID Consult   New subcutaneous gas on latest ct + WBC trending up

## 2024-10-07 NOTE — PROGRESS NOTE ADULT - SUBJECTIVE AND OBJECTIVE BOX
HITESH WAYNE  39y  [facility] 4B 017 B      Patient is a 39y old  Male who presents with a chief complaint of Abdominal pain     (06 Oct 2024 12:27)      My note supersedes the resident's note      INTERVAL HPI/OVERNIGHT EVENTS:    over course of 24 hours patient continued to report abdominal pain not improving , kub did not show change, but pursued ct scan which showed   < from: CT Abdomen and Pelvis w/ IV Cont (10.06.24 @ 21:47) >    Increased extent of multiple loops of mildly distended small bowel,   dilated up to 3.2 cm with transition to collapsed bowel within right   lower abdomen. Small bowel obstruction suspected.    New subcutaneous gas within right medial gluteal fold and ischioanal   fossa without any noticeable inflammatory change, of indeterminate   clinical significance; a necrotizing infection is a consideration in the   appropriate clinical setting.    < end of copied text >  notified surgery [id number], rec stat labs and oral contrast ,     REVIEW OF SYSTEMS:  CONSTITUTIONAL: No fever, weight loss, or fatigue  EYES: No eye pain, visual disturbances, or discharge  ENMT:  No difficulty hearing, tinnitus, vertigo; No sinus or throat pain  NECK: No pain or stiffness  BREASTS: No pain, masses, or nipple discharge  RESPIRATORY: No cough, wheezing, chills or hemoptysis; No shortness of breath  CARDIOVASCULAR: No chest pain, palpitations, dizziness, or leg swelling  GASTROINTESTINAL: + Abdominal pain and loose bowel movements   GENITOURINARY: No dysuria, frequency, hematuria, or incontinence  NEUROLOGICAL: No headaches, memory loss, loss of strength, numbness, or tremors  SKIN: No itching, burning, rashes, or lesions   LYMPH NODES: No enlarged glands  ENDOCRINE: No heat or cold intolerance; No hair loss  MUSCULOSKELETAL: No joint pain or swelling; No muscle, back, or extremity pain  PSYCHIATRIC: No depression, anxiety, mood swings, or difficulty sleeping  HEME/LYMPH: No easy bruising, or bleeding gums  ALLERY AND IMMUNOLOGIC: No hives or eczema  FAMILY HISTORY:    T(C): 36.4 (10-06-24 @ 16:18), Max: 36.7 (10-06-24 @ 07:58)  HR: 85 (10-06-24 @ 16:18) (66 - 85)  BP: 126/83 (10-06-24 @ 16:18) (119/75 - 126/83)  RR: 18 (10-06-24 @ 16:18) (18 - 18)  SpO2: 97% (10-06-24 @ 16:18) (97% - 98%)  Wt(kg): --Vital Signs Last 24 Hrs  T(C): 36.4 (06 Oct 2024 16:18), Max: 36.7 (06 Oct 2024 07:58)  T(F): 97.6 (06 Oct 2024 16:18), Max: 98.1 (06 Oct 2024 07:58)  HR: 85 (06 Oct 2024 16:18) (66 - 85)  BP: 126/83 (06 Oct 2024 16:18) (119/75 - 126/83)  BP(mean): --  RR: 18 (06 Oct 2024 16:18) (18 - 18)  SpO2: 97% (06 Oct 2024 16:18) (97% - 98%)    Parameters below as of 06 Oct 2024 16:18  Patient On (Oxygen Delivery Method): room air        PHYSICAL EXAM:  GENERAL: NAD,   HEAD:  Atraumatic, Normocephalic  EYES: EOMI, PERRLA, conjunctiva and sclera clear  ENMT: No tonsillar erythema, exudates, or enlargement; Moist mucous membranes, Good dentition, No lesions  NECK: Supple, No JVD, Normal thyroid  NERVOUS SYSTEM:  Alert & Oriented X3, Good concentration; Motor Strength 5/5 B/L upper and lower extremities; DTRs 2+ intact and symmetric  PULM: Clear to auscultation bilaterally  CARDIAC: Regular rate and rhythm; No murmurs, rubs, or gallops  GI: Soft, Nontender, Nondistended; Bowel sounds present  EXTREMITIES:  2+ Peripheral Pulses, No clubbing, cyanosis, or edema  LYMPH: No lymphadenopathy noted  SKIN: No rashes or lesions    Consultant(s) Notes Reviewed:  [x ] YES  [ ] NO  Care Discussed with Consultants/Other Providers [ x] YES  [ ] NO    LABS:                    Culture - Stool (collected 04 Oct 2024 18:01)  Source: .Stool  Preliminary Report (06 Oct 2024 07:16):    No enteric pathogens to date: Final culture pending      bisacodyl Suppository 10 milliGRAM(s) Rectal daily  influenza   Vaccine 0.5 milliLiter(s) IntraMuscular once  iohexol 300 mG (iodine)/mL Oral Solution 30 milliLiter(s) Oral once  ketorolac   Injectable 15 milliGRAM(s) IV Push every 6 hours PRN  lactated ringers. 1000 milliLiter(s) IV Continuous <Continuous>  levothyroxine 125 MICROGram(s) Oral daily  melatonin 5 milliGRAM(s) Oral at bedtime  ondansetron Injectable 4 milliGRAM(s) IV Push three times a day PRN  pantoprazole  Injectable 40 milliGRAM(s) IV Push daily  polyethylene glycol 3350 17 Gram(s) Oral two times a day  senna 2 Tablet(s) Oral at bedtime      HEALTH ISSUES - PROBLEM Dx:          Case Discussed with House Staff   55 minutes spent on total encounter; more than 50% of the visit was spent counseling and/or coordinating care by the attending physician.    Spectra [id number]

## 2024-10-07 NOTE — PROGRESS NOTE ADULT - ASSESSMENT
39 year old male with past medical history significant for hypothyroidism who presents to the ED with complaints of acute onset abdominal pain starting at 9am this morning. Patient states that he has a shot of vodka with a teaspoon of black pepper because he thought this would improve his symptoms, after which he started vomiting (non bloody, non bilious). He denies any fever at home. No history of inflammatory bowel disease, denies ever having a colonoscopy. He also tried Tums, GasX, and Pepto Bismol with no improvement.   CT scan at that time showed no definite obstruction. Possible ileus. No surgical intervention was planned. Surgery signed off at that time.   He was admitted to the medical service. Was NPO and was getting serial enemas and bowel rest.     He was able to produce only a small amount of stool after enemas. Pain continued.   He was nauseous and vomiting. Unable to tolerate PO.   NO NGT was placed and surgery was not made aware of the worsening status over the weekend.     This morning- called by hospitalist and was informed of the patient's status. Surgical team agreed to see patient today with rounding attending and in the interim instructed medicine team to place NGT for decompression.     Plan:   NPO, NGT   confirm placement with CXR   after decompression on sxn- can push oral contrast through tube and clamp tube   get CT A/P with PO contrast to evaluate for SBO   Soft tissue gas along rectum likely from microperforation from serial enemas.   No pain at this time around anus. EFREN normal    In the event patient does not improve with conservative management with NGT - he will be tentatively booked for OR for diagnostic laparoscopy, possible ex lap this week.   Surgery team to follow closely.   [id number]

## 2024-10-07 NOTE — CONSULT NOTE ADULT - ASSESSMENT
39 year old man with a past medical history of hypothyroidism and deafness with cochlear implants presented for severe diffuse abdominal pain of a few hours duration. Patient reported having diffuse abdominal pain, non radiating unbearable pain, that caused him to vomit a non-bilious non-bloody vomitus three times before presenting to the ED. Patient reported that the pain is unrelated to food intake. He has not had such pain before. He denied diarrhea, blood in stool, fever, chills.    CT abdomen/pelvis with IV contrast: Focal segment of dilated distal small bowel loops within the lower pelvis with early fecalization. No discrete transition point. Nonspecific but may reflect ileus or early obstruction    Ultrasound testicles: No sonographic evidence of testicular torsion. Small left hydrocele with approximate volume of 2 mL    < from: CT Abdomen and Pelvis w/ IV Cont (10.06.24 @ 21:47) >  Increased extent of multiple loops of mildly distended small bowel,   dilated up to 3.2 cm with transition to collapsed bowel within right   lower abdomen. Small bowel obstruction suspected.    New subcutaneous gas within right medial gluteal fold and ischioanal   fossa without any noticeable inflammatory change, of indeterminate   clinical significance; a necrotizing infection is a consideration in the   appropriate clinical setting.    < end of copied text >    IMPRESSION/RECOMMENDATIONS  Possible microperforation with CT 10/6 revealing new subcutaneous gas within right medial gluteal fold and ischioanal   fossa without any noticeable inflammatory change  Clinically no ongoing peritonitis  NG tube on suction  WBC 94  Sx : possible ex lap later this week    -f/u with Sx  -agree with Unasyn 3 gm iv q6h    Discussed with his mother Lisset at the bedside

## 2024-10-07 NOTE — PROGRESS NOTE ADULT - ATTENDING COMMENTS
Patient had nausea and vomiting today.  Complains of some abdominal pain.  No gas or bowel movements.  Had CT with iv contrast - possible SBO    PE:  AAO x3  Chest: clear.  CV : RRR  Abdomen: mildly distended, nontender    ASSESSMENT:  40 y/o male with Small Bowel Obstruction.  Abdominal pain.    PLAN:  - NPO, IVF  - NGT to low continuos suction  - follow serum electrolytes  - needs strict I/Os  - get CT with po contrast  Will follow up.

## 2024-10-08 LAB
ALBUMIN SERPL ELPH-MCNC: 3.8 G/DL — SIGNIFICANT CHANGE UP (ref 3.5–5.2)
ALP SERPL-CCNC: 51 U/L — SIGNIFICANT CHANGE UP (ref 30–115)
ALT FLD-CCNC: 8 U/L — SIGNIFICANT CHANGE UP (ref 0–41)
ANION GAP SERPL CALC-SCNC: 15 MMOL/L — HIGH (ref 7–14)
AST SERPL-CCNC: 11 U/L — SIGNIFICANT CHANGE UP (ref 0–41)
BASOPHILS # BLD AUTO: 0.03 K/UL — SIGNIFICANT CHANGE UP (ref 0–0.2)
BASOPHILS NFR BLD AUTO: 0.4 % — SIGNIFICANT CHANGE UP (ref 0–1)
BILIRUB SERPL-MCNC: 1 MG/DL — SIGNIFICANT CHANGE UP (ref 0.2–1.2)
BUN SERPL-MCNC: 15 MG/DL — SIGNIFICANT CHANGE UP (ref 10–20)
CALCIUM SERPL-MCNC: 8.4 MG/DL — SIGNIFICANT CHANGE UP (ref 8.4–10.5)
CHLORIDE SERPL-SCNC: 101 MMOL/L — SIGNIFICANT CHANGE UP (ref 98–110)
CO2 SERPL-SCNC: 23 MMOL/L — SIGNIFICANT CHANGE UP (ref 17–32)
CREAT SERPL-MCNC: 1 MG/DL — SIGNIFICANT CHANGE UP (ref 0.7–1.5)
CRP SERPL-MCNC: 16.9 MG/L — HIGH
CULTURE RESULTS: SIGNIFICANT CHANGE UP
EGFR: 98 ML/MIN/1.73M2 — SIGNIFICANT CHANGE UP
EOSINOPHIL # BLD AUTO: 0.24 K/UL — SIGNIFICANT CHANGE UP (ref 0–0.7)
EOSINOPHIL NFR BLD AUTO: 3.5 % — SIGNIFICANT CHANGE UP (ref 0–8)
ERYTHROCYTE [SEDIMENTATION RATE] IN BLOOD: 2 MM/HR — SIGNIFICANT CHANGE UP (ref 0–10)
GLUCOSE SERPL-MCNC: 61 MG/DL — LOW (ref 70–99)
HCT VFR BLD CALC: 42.7 % — SIGNIFICANT CHANGE UP (ref 42–52)
HGB BLD-MCNC: 14.6 G/DL — SIGNIFICANT CHANGE UP (ref 14–18)
IMM GRANULOCYTES NFR BLD AUTO: 0.3 % — SIGNIFICANT CHANGE UP (ref 0.1–0.3)
LYMPHOCYTES # BLD AUTO: 1.48 K/UL — SIGNIFICANT CHANGE UP (ref 1.2–3.4)
LYMPHOCYTES # BLD AUTO: 21.6 % — SIGNIFICANT CHANGE UP (ref 20.5–51.1)
MAGNESIUM SERPL-MCNC: 2 MG/DL — SIGNIFICANT CHANGE UP (ref 1.8–2.4)
MCHC RBC-ENTMCNC: 30.4 PG — SIGNIFICANT CHANGE UP (ref 27–31)
MCHC RBC-ENTMCNC: 34.2 G/DL — SIGNIFICANT CHANGE UP (ref 32–37)
MCV RBC AUTO: 88.8 FL — SIGNIFICANT CHANGE UP (ref 80–94)
MONOCYTES # BLD AUTO: 0.64 K/UL — HIGH (ref 0.1–0.6)
MONOCYTES NFR BLD AUTO: 9.3 % — SIGNIFICANT CHANGE UP (ref 1.7–9.3)
NEUTROPHILS # BLD AUTO: 4.44 K/UL — SIGNIFICANT CHANGE UP (ref 1.4–6.5)
NEUTROPHILS NFR BLD AUTO: 64.9 % — SIGNIFICANT CHANGE UP (ref 42.2–75.2)
NRBC # BLD: 0 /100 WBCS — SIGNIFICANT CHANGE UP (ref 0–0)
PLATELET # BLD AUTO: 271 K/UL — SIGNIFICANT CHANGE UP (ref 130–400)
PMV BLD: 10.3 FL — SIGNIFICANT CHANGE UP (ref 7.4–10.4)
POTASSIUM SERPL-MCNC: 3.6 MMOL/L — SIGNIFICANT CHANGE UP (ref 3.5–5)
POTASSIUM SERPL-SCNC: 3.6 MMOL/L — SIGNIFICANT CHANGE UP (ref 3.5–5)
PROT SERPL-MCNC: 5.7 G/DL — LOW (ref 6–8)
RBC # BLD: 4.81 M/UL — SIGNIFICANT CHANGE UP (ref 4.7–6.1)
RBC # FLD: 12.7 % — SIGNIFICANT CHANGE UP (ref 11.5–14.5)
SODIUM SERPL-SCNC: 139 MMOL/L — SIGNIFICANT CHANGE UP (ref 135–146)
SPECIMEN SOURCE: SIGNIFICANT CHANGE UP
WBC # BLD: 6.85 K/UL — SIGNIFICANT CHANGE UP (ref 4.8–10.8)
WBC # FLD AUTO: 6.85 K/UL — SIGNIFICANT CHANGE UP (ref 4.8–10.8)

## 2024-10-08 PROCEDURE — 99233 SBSQ HOSP IP/OBS HIGH 50: CPT

## 2024-10-08 PROCEDURE — 99232 SBSQ HOSP IP/OBS MODERATE 35: CPT

## 2024-10-08 PROCEDURE — 74018 RADEX ABDOMEN 1 VIEW: CPT | Mod: 26

## 2024-10-08 RX ADMIN — PANTOPRAZOLE SODIUM 40 MILLIGRAM(S): 40 TABLET, DELAYED RELEASE ORAL at 11:24

## 2024-10-08 RX ADMIN — AMPICILLIN, SULBACTAM 200 GRAM(S): 250; 125 INJECTION, POWDER, FOR SOLUTION INTRAMUSCULAR; INTRAVENOUS at 05:29

## 2024-10-08 RX ADMIN — AMPICILLIN, SULBACTAM 200 GRAM(S): 250; 125 INJECTION, POWDER, FOR SOLUTION INTRAMUSCULAR; INTRAVENOUS at 22:28

## 2024-10-08 RX ADMIN — AMPICILLIN, SULBACTAM 200 GRAM(S): 250; 125 INJECTION, POWDER, FOR SOLUTION INTRAMUSCULAR; INTRAVENOUS at 17:41

## 2024-10-08 RX ADMIN — AMPICILLIN, SULBACTAM 200 GRAM(S): 250; 125 INJECTION, POWDER, FOR SOLUTION INTRAMUSCULAR; INTRAVENOUS at 12:02

## 2024-10-08 NOTE — PROGRESS NOTE ADULT - ASSESSMENT
Assessment and Plan  Case of a 39 year old male patient with history of hypothyroidism and deafness s/p cochlear implants who presented to the ED on 10/03 for evaluation of diffuse abdominal pain associated with nausea and vomiting, found to have mild distention of ileum upto 4.4cm and colon stool burden with concern for ileus/early SBO on imaging. We are consulted in setting of ileus versus early SBO.      Periumbilical Pain with Nausea, Vomiting, and constipation  Gaseous Distention of SB/Ileum upto 4.4cm on 10/06 with Moderate Colon Stool Ethel  Suspect SBO; Rule Out Ileus VS Impaction  No Evidence of Anemia  * Presented on 10/03 for diffuse abdominal pain with nausea and non bilious non bloody vomiting; constipation; no unintentional weight loss or melena or hematochezia; uses ibuprofen few times per month (last time was 2 weeks ago)  * Last BM was on 10/03; last flatus was on 10/03; vomited 4 times from 10/03-10/05  * Hemodynamically stable  * Labs: WBC 9.8 on 10/03 -> 11.9 on 10/05 -> 9.48 on 10/07  * Hb trend: 15.6 on 10/03 -> 16.1 on 10/05 -> 16 on 10/07  * INR 1.19 on 10/07; LA 1 on 10/04  * Sedimentation Rate, Erythrocyte (10.08.24 @ 10:50): 2 mm/Hr  * C-Reactive Protein (10.08.24 @ 10:50) 16.9 mg/L  * CT Abdomen and Pelvis w/ IV Cont (10.03.24 @ 19:27) Focal segment of dilated distal small bowel loops within the lower pelvis with early fecalization. No discrete transition point. Nonspecific but may reflect ileus or early obstruction  * XR abdomen 10/04 x2 moderate fecal load without bowel dilation -> repeat on 10/04 PM: air filled loops of dilated SB; slightly increased; unchanged moderate colon stool burden  * Repeat KUB 10/05 gaseous distention of SB  * Repeat KUB 10/06 stable dilated SB loops to 4.4cm; stool in colon; cannot exclude SBO -> repeat KUB on 10/06: redemonstrated SBO  * Repeat CT abdomen pelvis IC on 10/06: Increased extent of multiple loops of mildly distended small bowel, dilated up to 3.2 cm with transition to collapsed bowel within right lower abdomen. Small bowel obstruction suspected.  * Repeat CT Abdomen and Pelvis w/ Oral Cont and w/ IV Cont (10.07.24 @ 20:05) Redemonstration of multiple loops of dilated small bowel, distended up to 3.2 cm, with gradual collapse of bowel within right lower abdomen, similar location to prior. Distal partially collapsed bowel demonstrate mild wall thickening, interloop ascites, increased since prior; findings may represent bowel obstruction versus enteritis. Follow-up x-ray to follow contrast transit may be helpful. Oral contrast remains within distended mid small bowel.Unchanged subcutaneous gaseous air within the right medial gluteal fold and ischioanal fossa without adjacent inflammation; findings of indeterminate clinical significance.  * Status post NG tube placement for 2 hours on 10/07 with marked improvement afterward  * FHx of IBS but no IBD or GI cancers  * No previous EGD or colonoscopy    RECOMMENDATIONS  - In the setting of resolution of abdominal pain, nausea, vomiting, and constipation and in the absence of clinical evidence of obstruction, can advance diet as tolerated. s/p NG tube decompression on 10/07 x2 hours  - Trend electrolytes and replete as indicated. Target K >4, Mg >2, PO4 >1. Repeat LA. ESR and CRP noted above  - Recommend serial abdominal exams. Check daily KUBs  - Surgery team on board  - Avoid Anticholingerics and Calcium channel blockers  - Monitor for pain: avoid NSAIDs (On IV toradol PRN). Limit opioids (currently on IV HM PRN)  - Monitor nausea: continue IV Zofran 4mg Q8h PRN if QTc is within normal  - Monitor BM and flatus. Avoid constipation. Continue miralax 17g BID and senna 2 tabs at bedtime; on dulcolax 10mg rectal suppository daily. s/p fleet enemas from 10/04-10/06. In case of diarrhea, would check stool culture, GI PCR, stool ova/parasites, clostridium difficile  - Can switch from IV to PO Protonix 40mg QD  - Monitor MAP and keep > 65mmHg  - Get STAT supine and upright abdominal plain film for any signs of perforation and call Surgery  - Would benefit from outpatient follow up with our [facility] for CT enterography/MR enterography, EGD, and colonoscopy (located at [address] [place] [address]. Phone Number: [phone])      Right Hepatic Lobe Subcm Hypodensity with Additional Sucm Hypodensities  * Noted on CT abdomen pelvis IC 10/03  * No previous US abdomen  * No previous acute hepatitis panel  * LFTs 0.8/52/12/13 on 10/05  * No FHx of liver diseases    RECOMMENDATIONS  - Trend LFTs  - Check acute hepatitis panel  - Check RUQ US abdomen  - Avoid hepatotoxic agents including alcohol  - Follow up with our [facility] located at [address] [place] [address], [id number]. Telephone No: [phone]      Thank you for your consult.  - Please note that plan was communicated with medical team.   - Please reach GI on [phone] during weekdays till 5pm.  - Please call the GI service line after 5pm on Weekdays and anytime on Weekends: [phone].      Dimitri Prajapati MD  PGY - 5 Gastroenterology Fellow   [facility]

## 2024-10-08 NOTE — PROGRESS NOTE ADULT - ATTENDING COMMENTS
Patient had CT with po contrast yesterday - showed dilated small bowel.  Overnight had multiple BMs, passing flatus.  Patient feels better    PE:  AAO x3  Chest: clear.  CV : RRR  Abdomen: soft.    ASSESSMENT:  40 y/o male with Small Bowel Obstruction, resolving  Abdominal pain.    PLAN:  - start clears and advance as tolerated  - IS  - follow serum electrolytes  - DVT proph

## 2024-10-08 NOTE — PROGRESS NOTE ADULT - ASSESSMENT
39 year old man with a past medical history of hypothyroidism and deafness with cochlear implants presented for severe diffuse abdominal pain of a few hours duration.    #Severe diffuse abdominal pain. with suspicion of bowel obstruction complicated by gluteal gas   - Started on Unasyn and Vanco  - Will get CT AP with Oral Contrast  - Surgery aware and awaiting plan  - NGT placed for relief  - Patient passed 6 BM since NGT placed  - Will switch to Augmentin on Dc  - DC planned tomorrow if continues having regular bowel movements    #Hypothyroidism  - Continue levothyroxine 125 mcg    #Deafness with cochlear implants     #Insomnia melatonin     PROGRESS NOTE HANDOFF    Pending: surgery follow up  , antibiotics, clinical improvement       Disposition: Home

## 2024-10-08 NOTE — PROGRESS NOTE ADULT - ASSESSMENT
39 year old male with past medical history significant for hypothyroidism who presents to the ED with complaints of acute onset abdominal pain starting at 9am this morning. Patient states that he has a shot of vodka with a teaspoon of black pepper because he thought this would improve his symptoms, after which he started vomiting (non bloody, non bilious). He denies any fever at home. No history of inflammatory bowel disease, denies ever having a colonoscopy. He also tried Tums, GasX, and Pepto Bismol with no improvement.   CT scan at that time showed no definite obstruction. Possible ileus. No surgical intervention was planned. Surgery signed off at that time.   He was admitted to the medical service. Was NPO and was getting serial enemas and bowel rest.     24 hour events: 10/7 CTAP Scan showed Redemonstration of multiple loops of dilated small bowel, distended up to 3.2 cm, with gradual collapse of bowel within right lower abdomen, similar location to prior. Distal partially collapsed bowel demonstrate mild wall thickening, interloop ascites, increased since prior; findings may represent bowel obstruction versus enteritis. Follow-up x-ray to follow contrast transit may be helpful. Oral contrast remains within distended mid small bowel. NGT intact, minimal output, on low continuous suction. - g, -bm. Abdomen mildly tender. No N/V.    Plan:   NPO, NGT, IVF  Soft tissue gas along rectum likely from microperforation from serial enemas over weekend.   AM labs    In the event patient does not improve with conservative management with NGT - he will be tentatively booked for OR for diagnostic laparoscopy, possible ex lap this Wednesday 10/9.   Surgery team to follow closely.   [id number] 39 year old male with past medical history significant for hypothyroidism who presents to the ED with complaints of acute onset abdominal pain starting at 9am this morning. Patient states that he has a shot of vodka with a teaspoon of black pepper because he thought this would improve his symptoms, after which he started vomiting (non bloody, non bilious). He denies any fever at home. No history of inflammatory bowel disease, denies ever having a colonoscopy. He also tried Tums, GasX, and Pepto Bismol with no improvement.   Patient with dilated loops of bowel, unable to tolerate PO. Surgery consulted for questionable SBO vs ileus; NGT was placed.   Patient now with multiple bowel movements.     Plan:   - patient with multiple bowel movements; NGT removed  - continue to monitor abdominal exam. no plan for OR at this time given bowel movements.   - advance diet as tolerated   - care per primary team  - please recall prn if clinical status changes    Discussed with Attending Dr. Bruce.   [id number]

## 2024-10-08 NOTE — PROGRESS NOTE ADULT - SUBJECTIVE AND OBJECTIVE BOX
Gastroenterology Progress Note      Location: [facility] 4B 017 B ([facility] 4B)  Patient Name: HITESH WAYNE  Age: 39y  Gender: Male      Chief Complaint  Patient is a 39y old Male who presents with a chief complaint of Abdominal Pain (07 Oct 2024 09:03)  Primary diagnosis of Abdominal pain      Reason for Consult  Periumbilical Pain with Nausea, Vomiting, and constipation  Gaseous Distention of SB/Ileum upto 4.4cm on 10/06 with Moderate Colon Stool [place]  Suspect SBO; Rule Out Ileus VS Impaction  No Evidence of Anemia      Progress Note  Patient reports marked improvement with resolution of abdominal pain.  He denies nausea or vomiting and is requesting foods.  He passed 5 BMs on 10/07 and 1 on 10/08 AM.  He passed flatus on 10/08.      Vital Signs in the last 24 hours   T(C): 36.9 (08 Oct 2024 16:50), Max: 36.9 (08 Oct 2024 16:50)  T(F): 98.5 (08 Oct 2024 16:50), Max: 98.5 (08 Oct 2024 16:50)  HR: 88 (08 Oct 2024 16:50) (77 - 88)  BP: 114/77 (08 Oct 2024 16:50) (108/64 - 124/68)  BP(mean): --  ABP: --  ABP(mean): --  RR: 17 (08 Oct 2024 16:50) (17 - 18)  SpO2: 98% (08 Oct 2024 16:50) (97% - 98%)    O2 Parameters below as of 08 Oct 2024 16:50  Patient On (Oxygen Delivery Method): room air        Physical Exam  * General Appearance: Alert, cooperative, interactive, oriented to time, place, and person, in no acute distress  * Eyes: PERRL, conjunctiva/corneas clear, EOM's intact, fundi benign, both eyes  * Lungs: Good bilateral air entry, normal breath sounds   * Heart: Regular Rate and Rhythm, normal S1 and S2, no audible murmur, rub, or gallop  * Abdomen: Symmetric, soft, non distended, non-tender, bowel sounds active all four quadrants, no masses  * Rectal: Brown stool, Normal tone, no palpable masses or tenderness      Investigations                          14.6   6.85  )-----------( 271      ( 08 Oct 2024 07:24 )             42.7     10-08    139  |  101  |  15  ----------------------------<  61[L]  3.6   |  23  |  1.0    Ca    8.4      08 Oct 2024 07:24  Mg     2.0     10-08    TPro  5.7[L]  /  Alb  3.8  /  TBili  1.0  /  DBili  x   /  AST  11  /  ALT  8   /  AlkPhos  51  10-08        LIVER FUNCTIONS - ( 08 Oct 2024 07:24 )  Alb: 3.8 g/dL / Pro: 5.7 g/dL / ALK PHOS: 51 U/L / ALT: 8 U/L / AST: 11 U/L / GGT: x           PT/INR - ( 07 Oct 2024 11:53 )   PT: 13.60 sec;   INR: 1.19 ratio         PTT - ( 07 Oct 2024 11:53 )  PTT:30.8 sec    Urinalysis Basic - ( 08 Oct 2024 07:24 )    Color: x / Appearance: x / SG: x / pH: x  Gluc: 61 mg/dL / Ketone: x  / Bili: x / Urobili: x   Blood: x / Protein: x / Nitrite: x   Leuk Esterase: x / RBC: x / WBC x   Sq Epi: x / Non Sq Epi: x / Bacteria: x      Microbiological Workup  Urinalysis Basic - ( 07 Oct 2024 08:15 )    Color: x / Appearance: x / SG: x / pH: x  Gluc: 77 mg/dL / Ketone: x  / Bili: x / Urobili: x   Blood: x / Protein: x / Nitrite: x   Leuk Esterase: x / RBC: x / WBC x   Sq Epi: x / Non Sq Epi: x / Bacteria: x      Radiological Workup  CT Abdomen and Pelvis w/ IV Cont:   ACC: [id number] EXAM:  CT ABDOMEN AND PELVIS IC   ORDERED BY: LM LOMELI       INTERPRETATION:  CLINICAL STATEMENT: Abdominal pain. Nausea. Vomiting.    TECHNIQUE: Contiguous axial CT images were obtained from the lower chest   to the pubic symphysis following administration of 95 cc Omnipaque 350   intravenous contrast.  Oral contrast was not administered.  Reformatted   images in the coronal and sagittal planes were acquired. 5 cc contrast   discarded.    Comparison made with CT abdomen and pelvis 10/3/2024.    FINDINGS:    LOWER CHEST: Unremarkable.    HEPATOBILIARY: Unchanged.    SPLEEN: Unremarkable.    PANCREAS: Unremarkable.    ADRENAL GLANDS: Unremarkable.    KIDNEYS: No hydronephrosis.    ABDOMINOPELVIC NODES: Unremarkable.    PELVIC ORGANS: Unremarkable.    PERITONEUM/MESENTERY/BOWEL: Increased extent of multiple loops of mildly   distended small bowel, dilated up to 3.2 cm with transition to collapsed   bowel within right lower abdomen (series 4, image 249). Mild ascites.   Normal appendix. No pneumoperitoneum.    BONES/SOFT TISSUES: Stable osseous structures. New subcutaneous gas   within right medial gluteal fold and ischioanal fossa without any   noticeable inflammatory change, of indeterminate clinical significance; a   necrotizing infection is a consideration in the appropriate clinical   setting (series 5, image [id number]).      IMPRESSION:  Since 10/3/2024:    Increased extent of multiple loops of mildly distended small bowel,   dilated up to 3.2 cm with transition to collapsed bowel within right   lower abdomen. Small bowel obstruction suspected.    New subcutaneous gas within right medial gluteal fold and ischioanal   fossa without any noticeable inflammatory change, of indeterminate   clinical significance; a necrotizing infection is a consideration in the   appropriate clinical setting.    Spoke with Dr. Canales    --- End of Report ---        Current Medications  Standing Medications  ampicillin/sulbactam  IVPB 3 Gram(s) IV Intermittent every 6 hours  ampicillin/sulbactam  IVPB      bisacodyl Suppository 10 milliGRAM(s) Rectal daily  influenza   Vaccine 0.5 milliLiter(s) IntraMuscular once  lactated ringers. 1000 milliLiter(s) (100 mL/Hr) IV Continuous <Continuous>  levothyroxine 125 MICROGram(s) Oral daily  melatonin 5 milliGRAM(s) Oral at bedtime  pantoprazole  Injectable 40 milliGRAM(s) IV Push daily  polyethylene glycol 3350 17 Gram(s) Oral two times a day  senna 2 Tablet(s) Oral at bedtime    PRN Medications  HYDROmorphone  Injectable 0.2 milliGRAM(s) IV Push every 4 hours PRN Severe Pain (7 - 10)  ketorolac   Injectable 15 milliGRAM(s) IV Push every 6 hours PRN Severe Pain (7 - 10)  ondansetron Injectable 4 milliGRAM(s) IV Push three times a day PRN Nausea and/or Vomiting    Singles Doses Administered  (ADM OVERRIDE) 1 each &lt;see task&gt; GiveOnce  (ADM OVERRIDE) 1 each &lt;see task&gt; GiveOnce  (ADM OVERRIDE) 1 each &lt;see task&gt; GiveOnce  (ADM OVERRIDE) 1 each &lt;see task&gt; GiveOnce  (ADM OVERRIDE) 1 each &lt;see task&gt; GiveOnce  acetaminophen   IVPB .. 1000 milliGRAM(s) IV Intermittent once  acetaminophen   IVPB .. 1000 milliGRAM(s) IV Intermittent Once  ampicillin/sulbactam  IVPB 3 Gram(s) IV Intermittent once  artificial tears (preservative free) Ophthalmic Solution 1 Drop(s) Both EYES once  benzocaine 20% Spray 1 Spray(s) Topical once  bisacodyl Suppository 10 milliGRAM(s) Rectal once  HYDROmorphone  Injectable 0.5 milliGRAM(s) IV Push once  HYDROmorphone  Injectable 0.2 milliGRAM(s) IV Push once  HYDROmorphone  Injectable 0.5 milliGRAM(s) IV Push once  HYDROmorphone  Injectable 0.5 milliGRAM(s) IV Push once  HYDROmorphone  Injectable 0.5 milliGRAM(s) IV Push once  HYDROmorphone  Injectable 0.5 milliGRAM(s) IV Push once  HYDROmorphone  Injectable 0.5 milliGRAM(s) IV Push once  iohexol 300 mG (iodine)/mL Oral Solution 30 milliLiter(s) Oral once  ketorolac   Injectable 15 milliGRAM(s) IV Push once  ketorolac   Injectable 30 milliGRAM(s) IV Push once  ketorolac   Injectable 15 milliGRAM(s) IV Push Once  ketorolac   Injectable 30 milliGRAM(s) IV Push once  ketorolac   Injectable 15 milliGRAM(s) IV Push once  lactated ringers Bolus 1000 milliLiter(s) IV Bolus once  lactulose Syrup 10 Gram(s) Oral every 1 hour  LORazepam     Tablet 1 milliGRAM(s) Oral once  methocarbamol 750 milliGRAM(s) Oral once  mineral oil enema 133 milliLiter(s) Rectal once  morphine  - Injectable 4 milliGRAM(s) IV Push Once  ondansetron Injectable 4 milliGRAM(s) IV Push Once  polyethylene glycol 3350 17 Gram(s) Oral once  saline laxative (FLEET) Rectal Enema 1 Enema Rectal once  saline laxative (FLEET) Rectal Enema 1 Enema Rectal once

## 2024-10-08 NOTE — PROGRESS NOTE ADULT - ASSESSMENT
· Assessment	  39 year old man with a past medical history of hypothyroidism and deafness with cochlear implants presented for severe diffuse abdominal pain of a few hours duration. Patient reported having diffuse abdominal pain, non radiating unbearable pain, that caused him to vomit a non-bilious non-bloody vomitus three times before presenting to the ED. Patient reported that the pain is unrelated to food intake. He has not had such pain before. He denied diarrhea, blood in stool, fever, chills.    CT abdomen/pelvis with IV contrast: Focal segment of dilated distal small bowel loops within the lower pelvis with early fecalization. No discrete transition point. Nonspecific but may reflect ileus or early obstruction    Ultrasound testicles: No sonographic evidence of testicular torsion. Small left hydrocele with approximate volume of 2 mL    < from: CT Abdomen and Pelvis w/ IV Cont (10.06.24 @ 21:47) >  Increased extent of multiple loops of mildly distended small bowel,   dilated up to 3.2 cm with transition to collapsed bowel within right   lower abdomen. Small bowel obstruction suspected.    New subcutaneous gas within right medial gluteal fold and ischioanal   fossa without any noticeable inflammatory change, of indeterminate   clinical significance; a necrotizing infection is a consideration in the   appropriate clinical setting.    < end of copied text >    IMPRESSION/RECOMMENDATIONS  Possible microperforation with CT 10/6 revealing new subcutaneous gas within right medial gluteal fold and ischioanal   fossa without any noticeable inflammatory change  Clinically no ongoing peritonitis ( Had BMs 10/7. No nausea/emesis )  NG tube on suction was removed 10/7  WBC 9.4    -f/u with Sx/GI  -Unasyn 3 gm iv q6h

## 2024-10-08 NOTE — PROGRESS NOTE ADULT - SUBJECTIVE AND OBJECTIVE BOX
SUBJECTIVE/OVERNIGHT EVENTS  Today is hospital day 5d. This morning patient was seen and examined at bedside, resting comfortably in bed. No acute or major events overnight.    MEDICATIONS  STANDING MEDICATIONS  ampicillin/sulbactam  IVPB 3 Gram(s) IV Intermittent every 6 hours  ampicillin/sulbactam  IVPB      bisacodyl Suppository 10 milliGRAM(s) Rectal daily  influenza   Vaccine 0.5 milliLiter(s) IntraMuscular once  lactated ringers. 1000 milliLiter(s) IV Continuous <Continuous>  levothyroxine 125 MICROGram(s) Oral daily  melatonin 5 milliGRAM(s) Oral at bedtime  pantoprazole  Injectable 40 milliGRAM(s) IV Push daily  polyethylene glycol 3350 17 Gram(s) Oral two times a day  senna 2 Tablet(s) Oral at bedtime  vancomycin  IVPB 1000 milliGRAM(s) IV Intermittent every 12 hours    PRN MEDICATIONS  HYDROmorphone  Injectable 0.2 milliGRAM(s) IV Push every 4 hours PRN  ketorolac   Injectable 15 milliGRAM(s) IV Push every 6 hours PRN  ondansetron Injectable 4 milliGRAM(s) IV Push three times a day PRN    VITALS  T(F): 97.6 (10-06-24 @ 16:18), Max: 97.6 (10-06-24 @ 16:18)  HR: 85 (10-06-24 @ 16:18) (85 - 85)  BP: 126/83 (10-06-24 @ 16:18) (126/83 - 126/83)  RR: 18 (10-06-24 @ 16:18) (18 - 18)  SpO2: 97% (10-06-24 @ 16:18) (97% - 97%)    PHYSICAL EXAM  GENERAL: NAD  HEART: RRR  LUNGS: Normal b/l air entry  ABDOMEN: Very tender, no guarding  EXTREMITIES: Normal, no edema, strong peripheral pulses  NERVOUS SYSTEM: AO4  SKIN: Warm and dry, intact    LABS             16.0   9.48  )-----------( 321      ( 10-07-24 @ 08:15 )             45.6     138  |  98  |  15  -------------------------<  77   10-07-24 @ 08:15  3.8  |  23  |  0.9    Ca      9.5     10-07-24 @ 08:15  Mg     2.0     10-07-24 @ 08:15    TPro  6.7  /  Alb  4.4  /  TBili  1.0  /  DBili  x   /  AST  13  /  ALT  10  /  AlkPhos  62  /  GGT  x     10-07-24 @ 08:15    PT/INR - ( 10-07-24 @ 11:53 )   PT: 13.60 sec[H];   INR: 1.19 ratio  PTT - ( 10-07-24 @ 11:53 )  PTT:30.8 sec      Urinalysis Basic - ( 07 Oct 2024 08:15 )    Color: x / Appearance: x / SG: x / pH: x  Gluc: 77 mg/dL / Ketone: x  / Bili: x / Urobili: x   Blood: x / Protein: x / Nitrite: x   Leuk Esterase: x / RBC: x / WBC x   Sq Epi: x / Non Sq Epi: x / Bacteria: x          Culture - Stool (collected 04 Oct 2024 18:01)  Source: .Stool  Final Report (07 Oct 2024 08:03):    No enteric pathogens isolated.    (Stool culture examined for Salmonella,    Shigella, Campylobacter, Aeromonas, Plesiomonas,    Vibrio, E.coli O157 and Yersinia)      IMAGING

## 2024-10-08 NOTE — PROGRESS NOTE ADULT - SUBJECTIVE AND OBJECTIVE BOX
GENERAL SURGERY PROGRESS NOTE     HITESH WAYNE  [address] [place] [address] day :6d    Surgical Attending: Dr. Bruce  24 hour events: 10/7 CTAP Scan showed Redemonstration of multiple loops of dilated small bowel, distended up to 3.2 cm, with gradual collapse of bowel within right lower abdomen, similar location to prior. Distal partially collapsed bowel demonstrate mild wall thickening, interloop ascites, increased since prior; findings may represent bowel obstruction versus enteritis. Follow-up x-ray to follow contrast transit may be helpful. Oral contrast remains within distended mid small bowel.  . NGT intact, minimal output, on low continuous suction. - g, -bm. Abdomen mildly tender. No N/V.    PHYSICAL EXAM:  General: NAD, AAOx3, calm and cooperative  HEENT: NCAT, F[place]ON, EOMI, Trachea ML, Neck supple  Respiratory: b/l equal chest rise, normal respiratory effort  Abdomen: Soft, non-distended, mild umbilical tenderness, NGT in place   Rectal: EFREN with brown stool, no blood  Skin: Warm/dry, normal color, no jaundice      T(F): 98.1 (10-08-24 @ 00:26), Max: 98.6 (10-07-24 @ 15:15)  HR: 77 (10-08-24 @ 00:26) (77 - 87)  BP: 108/64 (10-08-24 @ 00:26) (108/64 - 114/82)  ABP: --  ABP(mean): --  RR: 17 (10-08-24 @ 00:26) (17 - 18)  SpO2: 97% (10-08-24 @ 00:26) (96% - 97%)    IN'S / OUT's:    10-06-24 @ 07:01  -  10-07-24 @ 07:00  --------------------------------------------------------  IN:    IV PiggyBack: 100 mL    Lactated Ringers: 1200 mL  Total IN: 1300 mL    OUT:  Total OUT: 0 mL    Total NET: 1300 mL      10-07-24 @ 07:01  -  10-08-24 @ 00:49  --------------------------------------------------------  IN:    Lactated Ringers: 1100 mL  Total IN: 1100 mL    OUT:  Total OUT: 0 mL    Total NET: 1100 mL          MEDICATIONS  (STANDING):  ampicillin/sulbactam  IVPB      ampicillin/sulbactam  IVPB 3 Gram(s) IV Intermittent every 6 hours  bisacodyl Suppository 10 milliGRAM(s) Rectal daily  influenza   Vaccine 0.5 milliLiter(s) IntraMuscular once  lactated ringers. 1000 milliLiter(s) (100 mL/Hr) IV Continuous <Continuous>  levothyroxine 125 MICROGram(s) Oral daily  melatonin 5 milliGRAM(s) Oral at bedtime  pantoprazole  Injectable 40 milliGRAM(s) IV Push daily  polyethylene glycol 3350 17 Gram(s) Oral two times a day  senna 2 Tablet(s) Oral at bedtime    MEDICATIONS  (PRN):  HYDROmorphone  Injectable 0.2 milliGRAM(s) IV Push every 4 hours PRN Severe Pain (7 - 10)  ketorolac   Injectable 15 milliGRAM(s) IV Push every 6 hours PRN Severe Pain (7 - 10)  ondansetron Injectable 4 milliGRAM(s) IV Push three times a day PRN Nausea and/or Vomiting      LABS  Labs:  CAPILLARY BLOOD GLUCOSE                              16.0   9.48  )-----------( 321      ( 07 Oct 2024 08:15 )             45.6       Auto Neutrophil %: 73.8 % (10-07-24 @ 08:15)  Auto Immature Granulocyte %: 0.3 % (10-07-24 @ 08:15)    10-07    138  |  98  |  15  ----------------------------<  77  3.8   |  23  |  0.9      Calcium: 9.5 mg/dL (10-07-24 @ 08:15)      LFTs:             6.7  | 1.0  | 13       ------------------[62      ( 07 Oct 2024 08:15 )  4.4  | x    | 10          Lipase:x      Amylase:x             Coags:     13.60  ----< 1.19    ( 07 Oct 2024 11:53 )     30.8                Urinalysis Basic - ( 07 Oct 2024 08:15 )    Color: x / Appearance: x / SG: x / pH: x  Gluc: 77 mg/dL / Ketone: x  / Bili: x / Urobili: x   Blood: x / Protein: x / Nitrite: x   Leuk Esterase: x / RBC: x / WBC x   Sq Epi: x / Non Sq Epi: x / Bacteria: x            RADIOLOGY & ADDITIONAL TESTS:   GENERAL SURGERY PROGRESS NOTE     HITESH WAYNE  [address] [place] [address] day :6d    Surgical Attending: Dr. Bruce  24 hour events: 10/7 CTAP Scan showed Redemonstration of multiple loops of dilated small bowel, distended up to 3.2 cm, with gradual collapse of bowel within right lower abdomen, similar location to prior. Distal partially collapsed bowel demonstrate mild wall thickening, interloop ascites, increased since prior; findings may represent bowel obstruction versus enteritis. Follow-up x-ray to follow contrast transit may be helpful. Oral contrast remains within distended mid small bowel.  Patient with multiple bowel movements overnight, NGT removed, no n/v.     PHYSICAL EXAM:  General: NAD, AAOx3, calm and cooperative  Respiratory: b/l equal chest rise, normal respiratory effort  Abdomen: Soft, non-distended, mild umbilical tenderness  Skin: Warm/dry, normal color, no jaundice      T(F): 98.1 (10-08-24 @ 00:26), Max: 98.6 (10-07-24 @ 15:15)  HR: 77 (10-08-24 @ 00:26) (77 - 87)  BP: 108/64 (10-08-24 @ 00:26) (108/64 - 114/82)  ABP: --  ABP(mean): --  RR: 17 (10-08-24 @ 00:26) (17 - 18)  SpO2: 97% (10-08-24 @ 00:26) (96% - 97%)    IN'S / OUT's:    10-06-24 @ 07:01  -  10-07-24 @ 07:00  --------------------------------------------------------  IN:    IV PiggyBack: 100 mL    Lactated Ringers: 1200 mL  Total IN: 1300 mL    OUT:  Total OUT: 0 mL    Total NET: 1300 mL      10-07-24 @ 07:01  -  10-08-24 @ 00:49  --------------------------------------------------------  IN:    Lactated Ringers: 1100 mL  Total IN: 1100 mL    OUT:  Total OUT: 0 mL    Total NET: 1100 mL          MEDICATIONS  (STANDING):  ampicillin/sulbactam  IVPB      ampicillin/sulbactam  IVPB 3 Gram(s) IV Intermittent every 6 hours  bisacodyl Suppository 10 milliGRAM(s) Rectal daily  influenza   Vaccine 0.5 milliLiter(s) IntraMuscular once  lactated ringers. 1000 milliLiter(s) (100 mL/Hr) IV Continuous <Continuous>  levothyroxine 125 MICROGram(s) Oral daily  melatonin 5 milliGRAM(s) Oral at bedtime  pantoprazole  Injectable 40 milliGRAM(s) IV Push daily  polyethylene glycol 3350 17 Gram(s) Oral two times a day  senna 2 Tablet(s) Oral at bedtime    MEDICATIONS  (PRN):  HYDROmorphone  Injectable 0.2 milliGRAM(s) IV Push every 4 hours PRN Severe Pain (7 - 10)  ketorolac   Injectable 15 milliGRAM(s) IV Push every 6 hours PRN Severe Pain (7 - 10)  ondansetron Injectable 4 milliGRAM(s) IV Push three times a day PRN Nausea and/or Vomiting      LABS                          14.6   6.85  )-----------( 271      ( 08 Oct 2024 07:24 )             42.7     10-08    139  |  101  |  15  ----------------------------<  61[L]  3.6   |  23  |  1.0    Ca    8.4      08 Oct 2024 07:24  Mg     2.0     10-08    TPro  5.7[L]  /  Alb  3.8  /  TBili  1.0  /  DBili  x   /  AST  11  /  ALT  8   /  AlkPhos  51  10-08

## 2024-10-08 NOTE — PROGRESS NOTE ADULT - SUBJECTIVE AND OBJECTIVE BOX
HITESH WAYNE  39y, Male    All available historical data reviewed    OVERNIGHT EVENTS:  no fevers  NG tube out  no abd pain  had BMs  No nausea/emesis    ROS:  General: Denies rigors, nightsweats  HEENT: Denies headache, rhinorrhea, sore throat, eye pain  CV: Denies CP, palpitations  PULM: Denies wheezing, hemoptysis  GI: Denies hematemesis, hematochezia, melena  : Denies discharge, hematuria  MSK: Denies arthralgias, myalgias  SKIN: Denies rash, lesions  NEURO: Denies paresthesias, weakness  PSYCH: Denies depression, anxiety    VITALS:  T(F): 98.3, Max: 98.6 (10-07-24 @ 15:15)  HR: 77  BP: 124/68  RR: 18Vital Signs Last 24 Hrs  T(C): 36.8 (08 Oct 2024 08:00), Max: 37 (07 Oct 2024 15:15)  T(F): 98.3 (08 Oct 2024 08:00), Max: 98.6 (07 Oct 2024 15:15)  HR: 77 (08 Oct 2024 08:00) (77 - 87)  BP: 124/68 (08 Oct 2024 08:00) (108/64 - 124/68)  BP(mean): --  RR: 18 (08 Oct 2024 08:00) (17 - 18)  SpO2: 98% (08 Oct 2024 08:00) (96% - 98%)    Parameters below as of 08 Oct 2024 08:00  Patient On (Oxygen Delivery Method): room air        TESTS & MEASUREMENTS:                        16.0   9.48  )-----------( 321      ( 07 Oct 2024 08:15 )             45.6     10-07    138  |  98  |  15  ----------------------------<  77  3.8   |  23  |  0.9    Ca    9.5      07 Oct 2024 08:15  Mg     2.0     10-07    TPro  6.7  /  Alb  4.4  /  TBili  1.0  /  DBili  x   /  AST  13  /  ALT  10  /  AlkPhos  62  10-07    LIVER FUNCTIONS - ( 07 Oct 2024 08:15 )  Alb: 4.4 g/dL / Pro: 6.7 g/dL / ALK PHOS: 62 U/L / ALT: 10 U/L / AST: 13 U/L / GGT: x             Culture - Stool (collected 10-04-24 @ 18:01)  Source: .Stool  Final Report (10-07-24 @ 08:03):    No enteric pathogens isolated.    (Stool culture examined for Salmonella,    Shigella, Campylobacter, Aeromonas, Plesiomonas,    Vibrio, E.coli O157 and Yersinia)    Urinalysis with Rflx Culture (collected 10-03-24 @ 19:08)      Urinalysis Basic - ( 07 Oct 2024 08:15 )    Color: x / Appearance: x / SG: x / pH: x  Gluc: 77 mg/dL / Ketone: x  / Bili: x / Urobili: x   Blood: x / Protein: x / Nitrite: x   Leuk Esterase: x / RBC: x / WBC x   Sq Epi: x / Non Sq Epi: x / Bacteria: x          Social History:  Tobacco Use: No  Alcohol Use: No  Drug Use: No    RADIOLOGY & ADDITIONAL TESTS:  Personal review of radiological diagnostics performed  Echo and EKG results noted when applicable.     MEDICATIONS:  ampicillin/sulbactam  IVPB      ampicillin/sulbactam  IVPB 3 Gram(s) IV Intermittent every 6 hours  bisacodyl Suppository 10 milliGRAM(s) Rectal daily  HYDROmorphone  Injectable 0.2 milliGRAM(s) IV Push every 4 hours PRN  influenza   Vaccine 0.5 milliLiter(s) IntraMuscular once  ketorolac   Injectable 15 milliGRAM(s) IV Push every 6 hours PRN  lactated ringers. 1000 milliLiter(s) IV Continuous <Continuous>  levothyroxine 125 MICROGram(s) Oral daily  melatonin 5 milliGRAM(s) Oral at bedtime  ondansetron Injectable 4 milliGRAM(s) IV Push three times a day PRN  pantoprazole  Injectable 40 milliGRAM(s) IV Push daily      ANTIBIOTICS:  ampicillin/sulbactam  IVPB      ampicillin/sulbactam  IVPB 3 Gram(s) IV Intermittent every 6 hours

## 2024-10-08 NOTE — PROGRESS NOTE ADULT - ASSESSMENT
39 year old man with a past medical history of hypothyroidism and deafness with cochlear implants presented for severe diffuse abdominal pain of a few hours duration.    #Severe diffuse abdominal pain. with suspicion of bowel obstruction complicated by gluteal gas   unasyn   ng tube for 24 hours but requested removal as feels markedly better   having bowel movements   gi and surgery follow up   appreciate id follow up     #Hypothyroidism   levothyroxine 125 mcg    #Deafness with cochlear implants     #Insomnia melatonin     PROGRESS NOTE HANDOFF    Pending: surgery follow up  , gi follow up    Family discussion: patient verbalized understanding and agreeable to plan of care     Disposition: Home

## 2024-10-08 NOTE — PROGRESS NOTE ADULT - SUBJECTIVE AND OBJECTIVE BOX
HITESH WAYNE  39y  [facility] 4B 017 B      Patient is a 39y old  Male who presents with a chief complaint of Abdominal Pain (07 Oct 2024 09:03)      My note supersedes the resident's note      INTERVAL HPI/OVERNIGHT EVENTS:  sp ng tube , feels markedly better,  having bowel movements       REVIEW OF SYSTEMS:  CONSTITUTIONAL: No fever, weight loss, or fatigue  EYES: No eye pain, visual disturbances, or discharge  ENMT:  No difficulty hearing, tinnitus, vertigo; No sinus or throat pain  NECK: No pain or stiffness  BREASTS: No pain, masses, or nipple discharge  RESPIRATORY: No cough, wheezing, chills or hemoptysis; No shortness of breath  CARDIOVASCULAR: No chest pain, palpitations, dizziness, or leg swelling  GASTROINTESTINAL:marked improvement in abdominal pain   GENITOURINARY: No dysuria, frequency, hematuria, or incontinence  NEUROLOGICAL: No headaches, memory loss, loss of strength, numbness, or tremors  SKIN: No itching, burning, rashes, or lesions   LYMPH NODES: No enlarged glands  ENDOCRINE: No heat or cold intolerance; No hair loss  MUSCULOSKELETAL: No joint pain or swelling; No muscle, back, or extremity pain  PSYCHIATRIC: No depression, anxiety, mood swings, or difficulty sleeping  HEME/LYMPH: No easy bruising, or bleeding gums  ALLERY AND IMMUNOLOGIC: No hives or eczema  FAMILY HISTORY:    T(C): 36.8 (10-08-24 @ 08:00), Max: 37 (10-07-24 @ 15:15)  HR: 77 (10-08-24 @ 08:00) (77 - 87)  BP: 124/68 (10-08-24 @ 08:00) (108/64 - 124/68)  RR: 18 (10-08-24 @ 08:00) (17 - 18)  SpO2: 98% (10-08-24 @ 08:00) (96% - 98%)  Wt(kg): --Vital Signs Last 24 Hrs  T(C): 36.8 (08 Oct 2024 08:00), Max: 37 (07 Oct 2024 15:15)  T(F): 98.3 (08 Oct 2024 08:00), Max: 98.6 (07 Oct 2024 15:15)  HR: 77 (08 Oct 2024 08:00) (77 - 87)  BP: 124/68 (08 Oct 2024 08:00) (108/64 - 124/68)  BP(mean): --  RR: 18 (08 Oct 2024 08:00) (17 - 18)  SpO2: 98% (08 Oct 2024 08:00) (96% - 98%)    Parameters below as of 08 Oct 2024 08:00  Patient On (Oxygen Delivery Method): room air        PHYSICAL EXAM:  GENERAL: NAD,   HEAD:  Atraumatic, Normocephalic  EYES: EOMI, PERRLA, conjunctiva and sclera clear  ENMT: No tonsillar erythema, exudates, or enlargement; Moist mucous membranes, Good dentition, No lesions  NECK: Supple, No JVD, Normal thyroid  NERVOUS SYSTEM:  Alert & Oriented X3, Good concentration; Motor Strength 5/5 B/L upper and lower extremities; DTRs 2+ intact and symmetric  PULM: Clear to auscultation bilaterally  CARDIAC: Regular rate and rhythm; No murmurs, rubs, or gallops  GI: Soft, Nontender, Nondistended; Bowel sounds present  EXTREMITIES:  2+ Peripheral Pulses, No clubbing, cyanosis, or edema  LYMPH: No lymphadenopathy noted  SKIN: No rashes or lesions    Consultant(s) Notes Reviewed:  [x ] YES  [ ] NO  Care Discussed with Consultants/Other Providers [ x] YES  [ ] NO    LABS:                            14.6   6.85  )-----------( 271      ( 08 Oct 2024 07:24 )             42.7   10-07    138  |  98  |  15  ----------------------------<  77  3.8   |  23  |  0.9    Ca    9.5      07 Oct 2024 08:15  Mg     2.0     10-07    TPro  6.7  /  Alb  4.4  /  TBili  1.0  /  DBili  x   /  AST  13  /  ALT  10  /  AlkPhos  62  10-07            ampicillin/sulbactam  IVPB 3 Gram(s) IV Intermittent every 6 hours  ampicillin/sulbactam  IVPB      bisacodyl Suppository 10 milliGRAM(s) Rectal daily  HYDROmorphone  Injectable 0.2 milliGRAM(s) IV Push every 4 hours PRN  influenza   Vaccine 0.5 milliLiter(s) IntraMuscular once  ketorolac   Injectable 15 milliGRAM(s) IV Push every 6 hours PRN  lactated ringers. 1000 milliLiter(s) IV Continuous <Continuous>  levothyroxine 125 MICROGram(s) Oral daily  melatonin 5 milliGRAM(s) Oral at bedtime  ondansetron Injectable 4 milliGRAM(s) IV Push three times a day PRN  pantoprazole  Injectable 40 milliGRAM(s) IV Push daily      HEALTH ISSUES - PROBLEM Dx:          Case Discussed with House Staff   Spectra [phone]

## 2024-10-08 NOTE — PROGRESS NOTE ADULT - ATTENDING COMMENTS
Pt feeling better, abdominal pain resolved, having bms. Well appearing, abd soft, benign. CT imaging reviewed. Recommend advance diet gradually as tolerated, continue serial abd exams. If loose stools, check stool studies. Recommend outpt follow up for CTE and EGD/colonoscopy. Further recommendations per surgery.

## 2024-10-09 ENCOUNTER — TRANSCRIPTION ENCOUNTER (OUTPATIENT)
Age: 39
End: 2024-10-09

## 2024-10-09 VITALS
RESPIRATION RATE: 18 BRPM | DIASTOLIC BLOOD PRESSURE: 78 MMHG | HEART RATE: 77 BPM | TEMPERATURE: 99 F | OXYGEN SATURATION: 97 % | SYSTOLIC BLOOD PRESSURE: 120 MMHG

## 2024-10-09 LAB
ALBUMIN SERPL ELPH-MCNC: 3.8 G/DL — SIGNIFICANT CHANGE UP (ref 3.5–5.2)
ALP SERPL-CCNC: 47 U/L — SIGNIFICANT CHANGE UP (ref 30–115)
ALT FLD-CCNC: 9 U/L — SIGNIFICANT CHANGE UP (ref 0–41)
ANION GAP SERPL CALC-SCNC: 8 MMOL/L — SIGNIFICANT CHANGE UP (ref 7–14)
AST SERPL-CCNC: 10 U/L — SIGNIFICANT CHANGE UP (ref 0–41)
BASOPHILS # BLD AUTO: 0.04 K/UL — SIGNIFICANT CHANGE UP (ref 0–0.2)
BASOPHILS NFR BLD AUTO: 0.6 % — SIGNIFICANT CHANGE UP (ref 0–1)
BILIRUB SERPL-MCNC: 0.7 MG/DL — SIGNIFICANT CHANGE UP (ref 0.2–1.2)
BUN SERPL-MCNC: 11 MG/DL — SIGNIFICANT CHANGE UP (ref 10–20)
CALCIUM SERPL-MCNC: 8.6 MG/DL — SIGNIFICANT CHANGE UP (ref 8.4–10.5)
CHLORIDE SERPL-SCNC: 106 MMOL/L — SIGNIFICANT CHANGE UP (ref 98–110)
CO2 SERPL-SCNC: 30 MMOL/L — SIGNIFICANT CHANGE UP (ref 17–32)
CREAT SERPL-MCNC: 0.8 MG/DL — SIGNIFICANT CHANGE UP (ref 0.7–1.5)
EGFR: 115 ML/MIN/1.73M2 — SIGNIFICANT CHANGE UP
EOSINOPHIL # BLD AUTO: 0.21 K/UL — SIGNIFICANT CHANGE UP (ref 0–0.7)
EOSINOPHIL NFR BLD AUTO: 3 % — SIGNIFICANT CHANGE UP (ref 0–8)
GLUCOSE SERPL-MCNC: 89 MG/DL — SIGNIFICANT CHANGE UP (ref 70–99)
HAV IGM SER-ACNC: SIGNIFICANT CHANGE UP
HBV CORE IGM SER-ACNC: SIGNIFICANT CHANGE UP
HBV SURFACE AG SER-ACNC: SIGNIFICANT CHANGE UP
HCT VFR BLD CALC: 40.3 % — LOW (ref 42–52)
HCV AB S/CO SERPL IA: 0.08 S/CO — SIGNIFICANT CHANGE UP (ref 0–0.99)
HCV AB SERPL-IMP: SIGNIFICANT CHANGE UP
HGB BLD-MCNC: 13.8 G/DL — LOW (ref 14–18)
IMM GRANULOCYTES NFR BLD AUTO: 0.3 % — SIGNIFICANT CHANGE UP (ref 0.1–0.3)
LYMPHOCYTES # BLD AUTO: 1.69 K/UL — SIGNIFICANT CHANGE UP (ref 1.2–3.4)
LYMPHOCYTES # BLD AUTO: 23.9 % — SIGNIFICANT CHANGE UP (ref 20.5–51.1)
MAGNESIUM SERPL-MCNC: 2.2 MG/DL — SIGNIFICANT CHANGE UP (ref 1.8–2.4)
MCHC RBC-ENTMCNC: 30.4 PG — SIGNIFICANT CHANGE UP (ref 27–31)
MCHC RBC-ENTMCNC: 34.2 G/DL — SIGNIFICANT CHANGE UP (ref 32–37)
MCV RBC AUTO: 88.8 FL — SIGNIFICANT CHANGE UP (ref 80–94)
MONOCYTES # BLD AUTO: 0.59 K/UL — SIGNIFICANT CHANGE UP (ref 0.1–0.6)
MONOCYTES NFR BLD AUTO: 8.3 % — SIGNIFICANT CHANGE UP (ref 1.7–9.3)
NEUTROPHILS # BLD AUTO: 4.53 K/UL — SIGNIFICANT CHANGE UP (ref 1.4–6.5)
NEUTROPHILS NFR BLD AUTO: 63.9 % — SIGNIFICANT CHANGE UP (ref 42.2–75.2)
NRBC # BLD: 0 /100 WBCS — SIGNIFICANT CHANGE UP (ref 0–0)
PLATELET # BLD AUTO: 254 K/UL — SIGNIFICANT CHANGE UP (ref 130–400)
PMV BLD: 10.2 FL — SIGNIFICANT CHANGE UP (ref 7.4–10.4)
POTASSIUM SERPL-MCNC: 4.4 MMOL/L — SIGNIFICANT CHANGE UP (ref 3.5–5)
POTASSIUM SERPL-SCNC: 4.4 MMOL/L — SIGNIFICANT CHANGE UP (ref 3.5–5)
PROT SERPL-MCNC: 5.4 G/DL — LOW (ref 6–8)
RBC # BLD: 4.54 M/UL — LOW (ref 4.7–6.1)
RBC # FLD: 12.8 % — SIGNIFICANT CHANGE UP (ref 11.5–14.5)
SODIUM SERPL-SCNC: 144 MMOL/L — SIGNIFICANT CHANGE UP (ref 135–146)
WBC # BLD: 7.08 K/UL — SIGNIFICANT CHANGE UP (ref 4.8–10.8)
WBC # FLD AUTO: 7.08 K/UL — SIGNIFICANT CHANGE UP (ref 4.8–10.8)

## 2024-10-09 PROCEDURE — 99239 HOSP IP/OBS DSCHRG MGMT >30: CPT

## 2024-10-09 PROCEDURE — 74018 RADEX ABDOMEN 1 VIEW: CPT | Mod: 26

## 2024-10-09 RX ORDER — PANTOPRAZOLE SODIUM 40 MG/1
40 TABLET, DELAYED RELEASE ORAL
Refills: 0 | Status: DISCONTINUED | OUTPATIENT
Start: 2024-10-09 | End: 2024-10-09

## 2024-10-09 RX ADMIN — AMPICILLIN, SULBACTAM 200 GRAM(S): 250; 125 INJECTION, POWDER, FOR SOLUTION INTRAMUSCULAR; INTRAVENOUS at 05:37

## 2024-10-09 RX ADMIN — Medication 125 MICROGRAM(S): at 05:37

## 2024-10-09 NOTE — PROGRESS NOTE ADULT - SUBJECTIVE AND OBJECTIVE BOX
Gastroenterology Progress Note      Location: [facility] 4B 017 B ([facility] 4B)  Patient Name: HITESH WAYNE  Age: 39y  Gender: Male      Chief Complaint  Patient is a 39y old Male who presents with a chief complaint of Abdominal Pain (07 Oct 2024 09:03)  Primary diagnosis of Abdominal pain      Reason for Consult  Periumbilical Pain with Nausea, Vomiting, and constipation  Gaseous Distention of SB/Ileum upto 4.4cm on 10/06 with Moderate Colon Stool [place]  Suspect SBO; Rule Out Ileus VS Impaction  No Evidence of Anemia      Progress Note  Patient reports marked improvement with resolution of abdominal pain.  He denies nausea or vomiting and is tolerating PO diet (regular food).  He passed 5 BMs on 10/07 and a few on 10/08 AM.  He passed flatus on 10/08.      Vital Signs in the last 24 hours   Vital Signs Last 24 Hrs  T(C): 36.8 (09 Oct 2024 08:05), Max: 37 (09 Oct 2024 00:21)  T(F): 98.2 (09 Oct 2024 08:05), Max: 98.6 (09 Oct 2024 00:21)  HR: 77 (09 Oct 2024 08:05) (74 - 88)  BP: 119/78 (09 Oct 2024 08:05) (103/71 - 119/78)  BP(mean): --  RR: 18 (09 Oct 2024 08:05) (17 - 18)  SpO2: 98% (09 Oct 2024 08:05) (98% - 98%)    Parameters below as of 09 Oct 2024 08:05  Patient On (Oxygen Delivery Method): room air          Physical Exam  * General Appearance: Alert, cooperative, interactive, oriented to time, place, and person, in no acute distress  * Eyes: PERRL, conjunctiva/corneas clear, EOM's intact, fundi benign, both eyes  * Lungs: Good bilateral air entry, normal breath sounds   * Heart: Regular Rate and Rhythm, normal S1 and S2, no audible murmur, rub, or gallop  * Abdomen: Symmetric, soft, non distended, non-tender, bowel sounds active all four quadrants, no masses  * Rectal: Brown stool, Normal tone, no palpable masses or tenderness      Investigations                          13.8   7.08  )-----------( 254      ( 09 Oct 2024 06:58 )             40.3     10-09    144  |  106  |  11  ----------------------------<  89  4.4   |  30  |  0.8    Ca    8.6      09 Oct 2024 06:58  Mg     2.2     10-09    TPro  5.4[L]  /  Alb  3.8  /  TBili  0.7  /  DBili  x   /  AST  10  /  ALT  9   /  AlkPhos  47  10-09        LIVER FUNCTIONS - ( 09 Oct 2024 06:58 )  Alb: 3.8 g/dL / Pro: 5.4 g/dL / ALK PHOS: 47 U/L / ALT: 9 U/L / AST: 10 U/L / GGT: x           PT/INR - ( 07 Oct 2024 11:53 )   PT: 13.60 sec;   INR: 1.19 ratio         PTT - ( 07 Oct 2024 11:53 )  PTT:30.8 sec    Urinalysis Basic - ( 09 Oct 2024 06:58 )    Color: x / Appearance: x / SG: x / pH: x  Gluc: 89 mg/dL / Ketone: x  / Bili: x / Urobili: x   Blood: x / Protein: x / Nitrite: x   Leuk Esterase: x / RBC: x / WBC x   Sq Epi: x / Non Sq Epi: x / Bacteria: x        Urinalysis Basic - ( 08 Oct 2024 07:24 )    Color: x / Appearance: x / SG: x / pH: x  Gluc: 61 mg/dL / Ketone: x  / Bili: x / Urobili: x   Blood: x / Protein: x / Nitrite: x   Leuk Esterase: x / RBC: x / WBC x   Sq Epi: x / Non Sq Epi: x / Bacteria: x      Microbiological Workup  Urinalysis Basic - ( 07 Oct 2024 08:15 )    Color: x / Appearance: x / SG: x / pH: x  Gluc: 77 mg/dL / Ketone: x  / Bili: x / Urobili: x   Blood: x / Protein: x / Nitrite: x   Leuk Esterase: x / RBC: x / WBC x   Sq Epi: x / Non Sq Epi: x / Bacteria: x      Radiological Workup  CT Abdomen and Pelvis w/ IV Cont:   ACC: [id number] EXAM:  CT ABDOMEN AND PELVIS IC   ORDERED BY: LM LOMELI       INTERPRETATION:  CLINICAL STATEMENT: Abdominal pain. Nausea. Vomiting.    TECHNIQUE: Contiguous axial CT images were obtained from the lower chest   to the pubic symphysis following administration of 95 cc Omnipaque 350   intravenous contrast.  Oral contrast was not administered.  Reformatted   images in the coronal and sagittal planes were acquired. 5 cc contrast   discarded.    Comparison made with CT abdomen and pelvis 10/3/2024.    FINDINGS:    LOWER CHEST: Unremarkable.    HEPATOBILIARY: Unchanged.    SPLEEN: Unremarkable.    PANCREAS: Unremarkable.    ADRENAL GLANDS: Unremarkable.    KIDNEYS: No hydronephrosis.    ABDOMINOPELVIC NODES: Unremarkable.    PELVIC ORGANS: Unremarkable.    PERITONEUM/MESENTERY/BOWEL: Increased extent of multiple loops of mildly   distended small bowel, dilated up to 3.2 cm with transition to collapsed   bowel within right lower abdomen (series 4, image 249). Mild ascites.   Normal appendix. No pneumoperitoneum.    BONES/SOFT TISSUES: Stable osseous structures. New subcutaneous gas   within right medial gluteal fold and ischioanal fossa without any   noticeable inflammatory change, of indeterminate clinical significance; a   necrotizing infection is a consideration in the appropriate clinical   setting (series 5, image [id number]).      IMPRESSION:  Since 10/3/2024:    Increased extent of multiple loops of mildly distended small bowel,   dilated up to 3.2 cm with transition to collapsed bowel within right   lower abdomen. Small bowel obstruction suspected.    New subcutaneous gas within right medial gluteal fold and ischioanal   fossa without any noticeable inflammatory change, of indeterminate   clinical significance; a necrotizing infection is a consideration in the   appropriate clinical setting.    Spoke with Dr. Canales    --- End of Report ---        Current Medications  Standing Medications  ampicillin/sulbactam  IVPB 3 Gram(s) IV Intermittent every 6 hours  ampicillin/sulbactam  IVPB      bisacodyl Suppository 10 milliGRAM(s) Rectal daily  influenza   Vaccine 0.5 milliLiter(s) IntraMuscular once  lactated ringers. 1000 milliLiter(s) (100 mL/Hr) IV Continuous <Continuous>  levothyroxine 125 MICROGram(s) Oral daily  melatonin 5 milliGRAM(s) Oral at bedtime  pantoprazole  Injectable 40 milliGRAM(s) IV Push daily  polyethylene glycol 3350 17 Gram(s) Oral two times a day  senna 2 Tablet(s) Oral at bedtime    PRN Medications  HYDROmorphone  Injectable 0.2 milliGRAM(s) IV Push every 4 hours PRN Severe Pain (7 - 10)  ketorolac   Injectable 15 milliGRAM(s) IV Push every 6 hours PRN Severe Pain (7 - 10)  ondansetron Injectable 4 milliGRAM(s) IV Push three times a day PRN Nausea and/or Vomiting    Singles Doses Administered  (ADM OVERRIDE) 1 each &lt;see task&gt; GiveOnce  (ADM OVERRIDE) 1 each &lt;see task&gt; GiveOnce  (ADM OVERRIDE) 1 each &lt;see task&gt; GiveOnce  (ADM OVERRIDE) 1 each &lt;see task&gt; GiveOnce  (ADM OVERRIDE) 1 each &lt;see task&gt; GiveOnce  acetaminophen   IVPB .. 1000 milliGRAM(s) IV Intermittent once  acetaminophen   IVPB .. 1000 milliGRAM(s) IV Intermittent Once  ampicillin/sulbactam  IVPB 3 Gram(s) IV Intermittent once  artificial tears (preservative free) Ophthalmic Solution 1 Drop(s) Both EYES once  benzocaine 20% Spray 1 Spray(s) Topical once  bisacodyl Suppository 10 milliGRAM(s) Rectal once  HYDROmorphone  Injectable 0.5 milliGRAM(s) IV Push once  HYDROmorphone  Injectable 0.2 milliGRAM(s) IV Push once  HYDROmorphone  Injectable 0.5 milliGRAM(s) IV Push once  HYDROmorphone  Injectable 0.5 milliGRAM(s) IV Push once  HYDROmorphone  Injectable 0.5 milliGRAM(s) IV Push once  HYDROmorphone  Injectable 0.5 milliGRAM(s) IV Push once  HYDROmorphone  Injectable 0.5 milliGRAM(s) IV Push once  iohexol 300 mG (iodine)/mL Oral Solution 30 milliLiter(s) Oral once  ketorolac   Injectable 15 milliGRAM(s) IV Push once  ketorolac   Injectable 30 milliGRAM(s) IV Push once  ketorolac   Injectable 15 milliGRAM(s) IV Push Once  ketorolac   Injectable 30 milliGRAM(s) IV Push once  ketorolac   Injectable 15 milliGRAM(s) IV Push once  lactated ringers Bolus 1000 milliLiter(s) IV Bolus once  lactulose Syrup 10 Gram(s) Oral every 1 hour  LORazepam     Tablet 1 milliGRAM(s) Oral once  methocarbamol 750 milliGRAM(s) Oral once  mineral oil enema 133 milliLiter(s) Rectal once  morphine  - Injectable 4 milliGRAM(s) IV Push Once  ondansetron Injectable 4 milliGRAM(s) IV Push Once  polyethylene glycol 3350 17 Gram(s) Oral once  saline laxative (FLEET) Rectal Enema 1 Enema Rectal once  saline laxative (FLEET) Rectal Enema 1 Enema Rectal once

## 2024-10-09 NOTE — PROGRESS NOTE ADULT - ASSESSMENT
· Assessment	  39 year old man with a past medical history of hypothyroidism and deafness with cochlear implants presented for severe diffuse abdominal pain of a few hours duration. Patient reported having diffuse abdominal pain, non radiating unbearable pain, that caused him to vomit a non-bilious non-bloody vomitus three times before presenting to the ED. Patient reported that the pain is unrelated to food intake. He has not had such pain before. He denied diarrhea, blood in stool, fever, chills.    CT abdomen/pelvis with IV contrast: Focal segment of dilated distal small bowel loops within the lower pelvis with early fecalization. No discrete transition point. Nonspecific but may reflect ileus or early obstruction    Ultrasound testicles: No sonographic evidence of testicular torsion. Small left hydrocele with approximate volume of 2 mL    < from: CT Abdomen and Pelvis w/ IV Cont (10.06.24 @ 21:47) >  Increased extent of multiple loops of mildly distended small bowel,   dilated up to 3.2 cm with transition to collapsed bowel within right   lower abdomen. Small bowel obstruction suspected.    New subcutaneous gas within right medial gluteal fold and ischioanal   fossa without any noticeable inflammatory change, of indeterminate   clinical significance; a necrotizing infection is a consideration in the   appropriate clinical setting.    < end of copied text >    IMPRESSION/RECOMMENDATIONS  Possible microperforation with CT 10/6 revealing new subcutaneous gas within right medial gluteal fold and ischioanal   fossa without any noticeable inflammatory change  Clinically no ongoing peritonitis ( Has BMs.  No nausea/emesis ). On regular diet  NG tube on suction was removed 10/7  WBC 9.4    -Unasyn 3 gm iv q6h> change to po Augmentin 875 mg q12h for 4 more days    Please do not hesitate to recall ID if any questions arise either through [id number] or through [employer]

## 2024-10-09 NOTE — PROGRESS NOTE ADULT - PROVIDER SPECIALTY LIST ADULT
Hospitalist
Internal Medicine
Surgery
Surgery
Gastroenterology
Gastroenterology
Hospitalist
Internal Medicine
Hospitalist
Internal Medicine
Infectious Disease
Infectious Disease

## 2024-10-09 NOTE — DISCHARGE NOTE PROVIDER - NSDCCPCAREPLAN_GEN_ALL_CORE_FT
PRINCIPAL DISCHARGE DIAGNOSIS  Diagnosis: Intractable abdominal pain  Assessment and Plan of Treatment: You were admitted for severe abdominal pain which was due to a small bowel obstruction. You were seen by surgery and gastroenterology which deemed it not necessary for any surgical interventions. A nasogastric tube was placed for decompression which relieved the obstruciton. You were able to tolerate food and move your bowel. You are required to follow up with your primary care physician and with GI doctors at [facility] located at [address] [place] [address]. Phone Number: [phone]       PRINCIPAL DISCHARGE DIAGNOSIS  Diagnosis: Intractable abdominal pain  Assessment and Plan of Treatment: You were admitted for severe abdominal pain which was due to a small bowel obstruction. You were seen by surgery and gastroenterology which deemed it not necessary for any surgical interventions. A nasogastric tube was placed for decompression which relieved the obstruciton. You were able to tolerate food and move your bowel. You are required to follow up with your primary care physician Dr. Sherwood on 10/23/2024 at 8:00 AM and with GI doctors at [facility] located at [address] [place] [address]. Phone Number: [phone]

## 2024-10-09 NOTE — DISCHARGE NOTE NURSING/CASE MANAGEMENT/SOCIAL WORK - PATIENT PORTAL LINK FT
You can access the FollowMyHealth Patient Portal offered by [facility] by registering at the following website: [email or web address]. By joining [employer]’s FollowMyHealth portal, you will also be able to view your health information using other applications (apps) compatible with our system.

## 2024-10-09 NOTE — PROGRESS NOTE ADULT - ATTENDING COMMENTS
38yo male with suspected SBO, clinically improved. Feeling well, tolerating diet, no further abdominal pain. Continue serial abd exams, follow up KUB. Recommend outpt follow up for CTE vs MRE and EGD/colonoscopy. Further recommendations per surgery team.

## 2024-10-09 NOTE — PROGRESS NOTE ADULT - SUBJECTIVE AND OBJECTIVE BOX
HITESH WAYNE  39y, Male    All available historical data reviewed    OVERNIGHT EVENTS:  feels well and has no new complaints  No fevers  no abd pain  BMs, flatulence    ROS:  General: Denies rigors, nightsweats  HEENT: Denies headache, rhinorrhea, sore throat, eye pain  CV: Denies CP, palpitations  PULM: Denies wheezing, hemoptysis  GI: Denies hematemesis, hematochezia, melena  : Denies discharge, hematuria  MSK: Denies arthralgias, myalgias  SKIN: Denies rash, lesions  NEURO: Denies paresthesias, weakness  PSYCH: Denies depression, anxiety    VITALS:  T(F): 98.6, Max: 98.6 (10-09-24 @ 00:21)  HR: 74  BP: 103/71  RR: 18Vital Signs Last 24 Hrs  T(C): 37 (09 Oct 2024 00:21), Max: 37 (09 Oct 2024 00:21)  T(F): 98.6 (09 Oct 2024 00:21), Max: 98.6 (09 Oct 2024 00:21)  HR: 74 (09 Oct 2024 00:21) (74 - 88)  BP: 103/71 (09 Oct 2024 00:21) (103/71 - 124/68)  BP(mean): --  RR: 18 (09 Oct 2024 00:21) (17 - 18)  SpO2: 98% (09 Oct 2024 00:21) (98% - 98%)    Parameters below as of 09 Oct 2024 00:21  Patient On (Oxygen Delivery Method): room air        TESTS & MEASUREMENTS:                        14.6   6.85  )-----------( 271      ( 08 Oct 2024 07:24 )             42.7     10-08    139  |  101  |  15  ----------------------------<  61[L]  3.6   |  23  |  1.0    Ca    8.4      08 Oct 2024 07:24  Mg     2.0     10-08    TPro  5.7[L]  /  Alb  3.8  /  TBili  1.0  /  DBili  x   /  AST  11  /  ALT  8   /  AlkPhos  51  10-08    LIVER FUNCTIONS - ( 08 Oct 2024 07:24 )  Alb: 3.8 g/dL / Pro: 5.7 g/dL / ALK PHOS: 51 U/L / ALT: 8 U/L / AST: 11 U/L / GGT: x             Culture - Blood (collected 10-07-24 @ 08:15)  Source: .Blood BLOOD  Preliminary Report (10-08-24 @ 17:01):    No growth at 24 hours    Culture - Stool (collected 10-04-24 @ 18:01)  Source: .Stool  Final Report (10-07-24 @ 08:03):    No enteric pathogens isolated.    (Stool culture examined for Salmonella,    Shigella, Campylobacter, Aeromonas, Plesiomonas,    Vibrio, E.coli O157 and Yersinia)    Urinalysis with Rflx Culture (collected 10-03-24 @ 19:08)      Urinalysis Basic - ( 08 Oct 2024 07:24 )    Color: x / Appearance: x / SG: x / pH: x  Gluc: 61 mg/dL / Ketone: x  / Bili: x / Urobili: x   Blood: x / Protein: x / Nitrite: x   Leuk Esterase: x / RBC: x / WBC x   Sq Epi: x / Non Sq Epi: x / Bacteria: x          Social History:  Tobacco Use: No  Alcohol Use: No  Drug Use: No    RADIOLOGY & ADDITIONAL TESTS:  Personal review of radiological diagnostics performed  Echo and EKG results noted when applicable.     MEDICATIONS:  ampicillin/sulbactam  IVPB 3 Gram(s) IV Intermittent every 6 hours  ampicillin/sulbactam  IVPB      bisacodyl Suppository 10 milliGRAM(s) Rectal daily  HYDROmorphone  Injectable 0.2 milliGRAM(s) IV Push every 4 hours PRN  influenza   Vaccine 0.5 milliLiter(s) IntraMuscular once  ketorolac   Injectable 15 milliGRAM(s) IV Push every 6 hours PRN  levothyroxine 125 MICROGram(s) Oral daily  melatonin 5 milliGRAM(s) Oral at bedtime  ondansetron Injectable 4 milliGRAM(s) IV Push three times a day PRN  pantoprazole  Injectable 40 milliGRAM(s) IV Push daily      ANTIBIOTICS:  ampicillin/sulbactam  IVPB      ampicillin/sulbactam  IVPB 3 Gram(s) IV Intermittent every 6 hours

## 2024-10-09 NOTE — PROGRESS NOTE ADULT - NS ATTEST RISK PROBLEM GEN_ALL_CORE FT
I have personally seen and examined this patient. I have reviewed all pertinent clinical information and reviewed all relevant imaging ( and noted the impression from the Radiologist ) and diagnostic studies personally. I counseled the patient about the diagnostic testing and treatment plan. I discussed my recommendations with the primary team.
I have personally seen and examined this patient. I have reviewed all pertinent clinical information and reviewed all relevant imaging ( and noted the impression from the Radiologist ) and diagnostic studies personally. I counseled the patient about the diagnostic testing and treatment plan. I discussed my recommendations with the primary team.

## 2024-10-09 NOTE — DISCHARGE NOTE PROVIDER - NSDCFUSCHEDAPPT_GEN_ALL_CORE_FT
Jeromy Shewrood  [facility] PreAdmits  Scheduled Appointment: 10/23/2024    Jeromy Sherwood  [facility]  [address]  Scheduled Appointment: 10/23/2024

## 2024-10-09 NOTE — PROGRESS NOTE ADULT - ASSESSMENT
Assessment and Plan  Case of a 39 year old male patient with history of hypothyroidism and deafness s/p cochlear implants who presented to the ED on 10/03 for evaluation of diffuse abdominal pain associated with nausea and vomiting, found to have mild distention of ileum upto 4.4cm and colon stool burden with concern for ileus/early SBO on imaging. We are consulted in setting of ileus versus early SBO.      Periumbilical Pain with Nausea, Vomiting, and constipation- Resolved  Gaseous Distention of SB/Ileum upto 4.4cm on 10/06 with Moderate Colon Stool [place]  Likely Enteritis Causing SBO-Like Picture VS Ileus  No Evidence of Anemia  * Presented on 10/03 for diffuse abdominal pain with nausea and non bilious non bloody vomiting; constipation; no unintentional weight loss or melena or hematochezia; uses ibuprofen few times per month (last time was 2 weeks ago)  * Last BM was on 10/03; last flatus was on 10/03; vomited 4 times from 10/03-10/05  * Hemodynamically stable  * Labs: WBC 9.8 on 10/03 -> 11.9 on 10/05 -> 9.48 on 10/07   * Hb trend: 15.6 on 10/03 -> 16.1 on 10/05 -> 16 on 10/07  * INR 1.19 on 10/07; LA 1 on 10/04  * Sedimentation Rate, Erythrocyte (10.08.24 @ 10:50): 2 mm/Hr  * C-Reactive Protein (10.08.24 @ 10:50) 16.9 mg/L  * CT Abdomen and Pelvis w/ IV Cont (10.03.24 @ 19:27) Focal segment of dilated distal small bowel loops within the lower pelvis with early fecalization. No discrete transition point. Nonspecific but may reflect ileus or early obstruction  * XR abdomen 10/04 x2 moderate fecal load without bowel dilation -> repeat on 10/04 PM: air filled loops of dilated SB; slightly increased; unchanged moderate colon stool burden  * Repeat KUB 10/05 gaseous distention of SB  * Repeat KUB 10/06 stable dilated SB loops to 4.4cm; stool in colon; cannot exclude SBO -> repeat KUB on 10/06: redemonstrated SBO  * Repeat CT abdomen pelvis IC on 10/06: Increased extent of multiple loops of mildly distended small bowel, dilated up to 3.2 cm with transition to collapsed bowel within right lower abdomen. Small bowel obstruction suspected.  * Repeat CT Abdomen and Pelvis w/ Oral Cont and w/ IV Cont (10.07.24 @ 20:05) Redemonstration of multiple loops of dilated small bowel, distended up to 3.2 cm, with gradual collapse of bowel within right lower abdomen, similar location to prior. Distal partially collapsed bowel demonstrate mild wall thickening, interloop ascites, increased since prior; findings may represent bowel obstruction versus enteritis. Follow-up x-ray to follow contrast transit may be helpful. Oral contrast remains within distended mid small bowel.Unchanged subcutaneous gaseous air within the right medial gluteal fold and ischioanal fossa without adjacent inflammation; findings of indeterminate clinical significance.  * Status post NG tube placement for 2 hours on 10/07 with marked improvement afterward  * Xray Abdomen 1 View PORTABLE -Routine (Xray Abdomen 1 View PORTABLE -Routine .) (10.08.24 @ 08:16) Contrast-filled loops of large bowel.Air-filled loops of small bowel.No organomegaly.Findings most consistent with ileus.  * FHx of IBS but no IBD or GI cancers  * No previous EGD or colonoscopy    RECOMMENDATIONS  - In the setting of resolution of abdominal pain, nausea, vomiting, and constipation and in the absence of clinical evidence of obstruction, can advance diet as tolerated: tolerated . s/p NG tube decompression on 10/07 x2 hours  - Trend electrolytes and replete as indicated. Target K >4, Mg >2, PO4 >1. Repeat LA. ESR and CRP noted above  - Recommend serial abdominal exams. Check daily KUBs: repeat today  - Surgery team on board  - Avoid Anticholingerics and Calcium channel blockers  - Monitor for pain: avoid NSAIDs (On IV toradol PRN). Limit opioids (currently on IV HM PRN)  - Monitor nausea: continue IV Zofran 4mg Q8h PRN if QTc is within normal  - Monitor BM and flatus. Avoid constipation. Continue miralax 17g BID and senna 2 tabs at bedtime; on dulcolax 10mg rectal suppository daily. s/p fleet enemas from 10/04-10/06. In case of diarrhea, would check stool culture, GI PCR, stool ova/parasites, clostridium difficile  - Continue PO Protonix 40mg QD  - Monitor MAP and keep > 65mmHg  - Get STAT supine and upright abdominal plain film for any signs of perforation and call Surgery  - Would benefit from outpatient follow up with our [facility] for CT enterography/MR enterography, EGD, and colonoscopy (located at [address] [place] [address]. Phone Number: [phone])      Right Hepatic Lobe Subcm Hypodensity with Additional Sucm Hypodensities  * Noted on CT abdomen pelvis IC 10/03  * No previous US abdomen  * No previous acute hepatitis panel  * LFTs 0.8/52/12/13 on 10/05  * No FHx of liver diseases    RECOMMENDATIONS  - Trend LFTs  - Check acute hepatitis panel  - Check RUQ US abdomen  - Avoid hepatotoxic agents including alcohol  - Follow up with our [facility] located at [address] [place] [address], [address]. Telephone No: [phone]      Thank you for your consult.  - Please note that plan was communicated with medical team.   - Please reach GI on [phone] during weekdays till 5pm.  - Please call the GI service line after 5pm on Weekdays and anytime on Weekends: [phone].      Dimitri Prajapati MD  PGY - 5 Gastroenterology Fellow   [facility]   Assessment and Plan  Case of a 39 year old male patient with history of hypothyroidism and deafness s/p cochlear implants who presented to the ED on 10/03 for evaluation of diffuse abdominal pain associated with nausea and vomiting, found to have mild distention of ileum upto 4.4cm and colon stool burden with concern for ileus/early SBO on imaging. We are consulted in setting of ileus versus early SBO.      Periumbilical Pain with Nausea, Vomiting, and constipation- Resolved  Gaseous Distention of SB/Ileum upto 4.4cm on 10/06 with Moderate Colon Stool [place]  Likely Enteritis Causing SBO-Like Picture VS Ileus  No Evidence of Anemia  * Presented on 10/03 for diffuse abdominal pain with nausea and non bilious non bloody vomiting; constipation; no unintentional weight loss or melena or hematochezia; uses ibuprofen few times per month (last time was 2 weeks ago)  * Last BM was on 10/03; last flatus was on 10/03; vomited 4 times from 10/03-10/05  * Hemodynamically stable  * Labs: WBC 9.8 on 10/03 -> 11.9 on 10/05 -> 9.48 on 10/07   * Hb trend: 15.6 on 10/03 -> 16.1 on 10/05 -> 16 on 10/07  * INR 1.19 on 10/07; LA 1 on 10/04  * Sedimentation Rate, Erythrocyte (10.08.24 @ 10:50): 2 mm/Hr  * C-Reactive Protein (10.08.24 @ 10:50) 16.9 mg/L  * CT Abdomen and Pelvis w/ IV Cont (10.03.24 @ 19:27) Focal segment of dilated distal small bowel loops within the lower pelvis with early fecalization. No discrete transition point. Nonspecific but may reflect ileus or early obstruction  * XR abdomen 10/04 x2 moderate fecal load without bowel dilation -> repeat on 10/04 PM: air filled loops of dilated SB; slightly increased; unchanged moderate colon stool burden  * Repeat KUB 10/05 gaseous distention of SB  * Repeat KUB 10/06 stable dilated SB loops to 4.4cm; stool in colon; cannot exclude SBO -> repeat KUB on 10/06: redemonstrated SBO  * Repeat CT abdomen pelvis IC on 10/06: Increased extent of multiple loops of mildly distended small bowel, dilated up to 3.2 cm with transition to collapsed bowel within right lower abdomen. Small bowel obstruction suspected.  * Repeat CT Abdomen and Pelvis w/ Oral Cont and w/ IV Cont (10.07.24 @ 20:05) Redemonstration of multiple loops of dilated small bowel, distended up to 3.2 cm, with gradual collapse of bowel within right lower abdomen, similar location to prior. Distal partially collapsed bowel demonstrate mild wall thickening, interloop ascites, increased since prior; findings may represent bowel obstruction versus enteritis. Follow-up x-ray to follow contrast transit may be helpful. Oral contrast remains within distended mid small bowel.Unchanged subcutaneous gaseous air within the right medial gluteal fold and ischioanal fossa without adjacent inflammation; findings of indeterminate clinical significance.  * Status post NG tube placement for 2 hours on 10/07 with marked improvement afterward  * Xray Abdomen 1 View PORTABLE -Routine (Xray Abdomen 1 View PORTABLE -Routine .) (10.08.24 @ 08:16) Contrast-filled loops of large bowel.Air-filled loops of small bowel.No organomegaly.Findings most consistent with ileus.  * FHx of IBS but no IBD or GI cancers  * No previous EGD or colonoscopy    RECOMMENDATIONS  - In the setting of resolution of abdominal pain, nausea, vomiting, and constipation and in the absence of clinical evidence of obstruction, can advance diet as tolerated:  s/p NG tube decompression on 10/07 x2 hours  - Trend electrolytes and replete as indicated. Target K >4, Mg >2, PO4 >1. Repeat LA. ESR and CRP noted above  - Recommend serial abdominal exams. Check daily KUBs: repeat today  - Surgery team on board  - Avoid Anticholingerics and Calcium channel blockers  - Monitor for pain: avoid NSAIDs (On IV toradol PRN). Limit opioids (currently on IV HM PRN)  - Monitor nausea: continue IV Zofran 4mg Q8h PRN if QTc is within normal  - Monitor BM and flatus. Avoid constipation. Continue miralax 17g BID and senna 2 tabs at bedtime; on dulcolax 10mg rectal suppository daily. s/p fleet enemas from 10/04-10/06. In case of diarrhea, would check stool culture, GI PCR, stool ova/parasites, clostridium difficile  - Continue PO Protonix 40mg QD  - Monitor MAP and keep > 65mmHg  - Get STAT supine and upright abdominal plain film for any signs of perforation and call Surgery  - Would benefit from outpatient follow up with our [facility] for CT enterography/MR enterography, EGD, and colonoscopy (located at [address] [place] [address]. Phone Number: [phone])      Right Hepatic Lobe Subcm Hypodensity with Additional Sucm Hypodensities  * Noted on CT abdomen pelvis IC 10/03  * No previous US abdomen  * No previous acute hepatitis panel  * LFTs 0.8/52/12/13 on 10/05  * No FHx of liver diseases    RECOMMENDATIONS  - Trend LFTs  - Check acute hepatitis panel  - Check RUQ US abdomen  - Avoid hepatotoxic agents including alcohol  - Follow up with our [facility] located at [address] [place] [address], [address]. Telephone No: [phone]      Thank you for your consult.  - Please note that plan was communicated with medical team.   - Please reach GI on [phone] during weekdays till 5pm.  - Please call the GI service line after 5pm on Weekdays and anytime on Weekends: [phone].      Dimitri Prajapati MD  PGY - 5 Gastroenterology Fellow   [facility]

## 2024-10-09 NOTE — DISCHARGE NOTE PROVIDER - CARE PROVIDER_API CALL
Jeromy Sherwood  Internal Medicine  [address] [place] [id number]  Phone: [phone]  Fax: [phone]  Follow Up Time:

## 2024-10-09 NOTE — DISCHARGE NOTE PROVIDER - HOSPITAL COURSE
39 year old man with a past medical history of hypothyroidism and deafness with cochlear implants presented for severe diffuse abdominal pain of a few hours duration. Patient reported having diffuse abdominal pain, non radiating unbearable pain, that caused him to vomit a non-bilious non-bloody vomitus three times before presenting to the ED. Patient reported that the pain is unrelated to food intake. He has not had such pain before. He denied diarrhea, blood in stool, fever, chills, changes in urinary habits, sob, chest pain, headache, rashes or palpitations. 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 Patient traveled in August to [place] where reported drinking from tap water, eating meat and walked barefoot on the beach. He did not eat anything from outside yesterday besides a cake. He does not smoke, drink alcohol or uses drugs    in the ED: · BP Systolic	136 mm Hg BP Diastolic	88 mm Hg  Heart Rate	96 /min Respiration Rate (breaths/min)	18 /min Temp (C)	36.8 Degrees C    CT abdomen/pelvis with IV contrast: Focal segment of dilated distal small bowel loops within the lower pelvis with early fecalization. No discrete transition point. Nonspecific but   may reflect ileus or early obstruction    Ultrasound testicles: No sonographic evidence of testicular torsion. Small left hydrocele with approximate volume of 2 mL   (03 Oct 2024 23:43)    Hospital Course:  Patient remained hemodynamically stable. Was in agonizing pain after first CT Scan so a second CT Scan with PO contrast was ordered and showed SBO with subcutaneous gas over the distal rectal area. GI and Surgery were consulted with no surgical intervention unless patient status deteriorates. NGT was placed with immediate relief following placement and patient was able to move his bowels 6 times that night. Diet advanced the next day and patient tolerated feeds well. Serial KUBs show some stool burden but no evidence of SBO or any acute pathology.    A/P:  39 year old man with a past medical history of hypothyroidism and deafness with cochlear implants presented for severe diffuse abdominal pain of a few hours duration.    #Severe diffuse abdominal pain. with suspicion of bowel obstruction complicated by gluteal gas - Resolved  - Switch to Augmentin 875mg for 4 more days  - s/p NGT decompression for 2 hours; felt markedly better   - having bowel movements    #Hypothyroidism   - Continue levothyroxine 125 mcg    #Deafness with cochlear implants     #Insomnia - Resolved  - Likely due to pain   - Was given melatonin     Disposition: Home    Discussion of discharge plan of care, including discharge diagnoses, medication reconciliation, and follow-ups was conducted with Dr. Márquez on 10/09/2024, and discharge was approved.       Medicine attending -    Patient seen and examined this morning  lying comfortably in bed  in nad  no complaints  eating well  tolerating diet and having BM's  asking to go home    Vital Signs Last 24 Hrs  T(C): 37 (09 Oct 2024 09:00), Max: 37 (09 Oct 2024 00:21)  T(F): 98.6 (09 Oct 2024 09:00), Max: 98.6 (09 Oct 2024 00:21)  HR: 77 (09 Oct 2024 09:00) (74 - 88)  BP: 120/78 (09 Oct 2024 09:00) (103/71 - 120/78)  BP(mean): --  RR: 18 (09 Oct 2024 09:00) (17 - 18)  SpO2: 97% (09 Oct 2024 09:00) (97% - 98%)    Parameters below as of 09 Oct 2024 09:00  Patient On (Oxygen Delivery Method): room air    39 year old man with a past medical history of hypothyroidism and deafness with cochlear implants presented for severe diffuse abdominal pain of a few hours duration. Patient reported having diffuse abdominal pain, non radiating unbearable pain, that caused him to vomit a non-bilious non-bloody vomitus three times before presenting to the ED. Patient reported that the pain is unrelated to food intake. He has not had such pain before. He denied diarrhea, blood in stool, fever, chills, changes in urinary habits, sob, chest pain, headache, rashes or palpitations. 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 Patient traveled in August to [place] where reported drinking from tap water, eating meat and walked barefoot on the beach. He did not eat anything from outside yesterday besides a cake. He does not smoke, drink alcohol or uses drugs    in the ED: · BP Systolic	136 mm Hg BP Diastolic	88 mm Hg  Heart Rate	96 /min Respiration Rate (breaths/min)	18 /min Temp (C)	36.8 Degrees C    CT abdomen/pelvis with IV contrast: Focal segment of dilated distal small bowel loops within the lower pelvis with early fecalization. No discrete transition point. Nonspecific but   may reflect ileus or early obstruction    Ultrasound testicles: No sonographic evidence of testicular torsion. Small left hydrocele with approximate volume of 2 mL   (03 Oct 2024 23:43)    Hospital Course:  Patient remained hemodynamically stable. Was in agonizing pain after first CT Scan so a second CT Scan with PO contrast was ordered and showed SBO with subcutaneous gas over the distal rectal area. GI and Surgery were consulted with no surgical intervention unless patient status deteriorates. NGT was placed with immediate relief following placement and patient was able to move his bowels 6 times that night. Diet advanced the next day and patient tolerated feeds well. Serial KUBs show some stool burden but no evidence of SBO or any acute pathology.    A/P:  39 year old man with a past medical history of hypothyroidism and deafness with cochlear implants presented for severe diffuse abdominal pain of a few hours duration.    #Severe diffuse abdominal pain. with suspicion of bowel obstruction complicated by gluteal gas - Resolved  - Switch to Augmentin 875mg for 4 more days  - s/p NGT decompression for 2 hours; felt markedly better   - having bowel movements    #Hypothyroidism   - Continue levothyroxine 125 mcg    #Deafness with cochlear implants     #Insomnia - Resolved  - Likely due to pain   - Was given melatonin     Discussion of discharge plan of care, including discharge diagnoses, medication reconciliation, and follow-ups was conducted with Dr. Márquez on 10/09/2024, and discharge was approved.    D/C today; d/c planning took over 75 min  d/c papers edited by me  discussed d/c plan and all instructions in detail

## 2024-10-09 NOTE — DISCHARGE NOTE PROVIDER - NSDCMRMEDTOKEN_GEN_ALL_CORE_FT
amoxicillin-clavulanate 875 mg-125 mg oral tablet: 875 milligram(s) orally 2 times a day  levothyroxine 125 mcg (0.125 mg) oral capsule: 1 cap(s) orally once a day

## 2024-10-10 DIAGNOSIS — Z96.21 COCHLEAR IMPLANT STATUS: Chronic | ICD-10-CM

## 2024-10-12 PROBLEM — H91.90 UNSPECIFIED HEARING LOSS, UNSPECIFIED EAR: Chronic | Status: ACTIVE | Noted: 2024-10-10

## 2024-10-12 LAB
CULTURE RESULTS: SIGNIFICANT CHANGE UP
SPECIMEN SOURCE: SIGNIFICANT CHANGE UP

## 2024-10-16 DIAGNOSIS — Z79.890 HORMONE REPLACEMENT THERAPY: ICD-10-CM

## 2024-10-16 DIAGNOSIS — E03.9 HYPOTHYROIDISM, UNSPECIFIED: ICD-10-CM

## 2024-10-16 DIAGNOSIS — H91.90 UNSPECIFIED HEARING LOSS, UNSPECIFIED EAR: ICD-10-CM

## 2024-10-16 DIAGNOSIS — G47.00 INSOMNIA, UNSPECIFIED: ICD-10-CM

## 2024-10-16 DIAGNOSIS — K56.609 UNSPECIFIED INTESTINAL OBSTRUCTION, UNSPECIFIED AS TO PARTIAL VERSUS COMPLETE OBSTRUCTION: ICD-10-CM

## 2024-10-16 DIAGNOSIS — K52.9 NONINFECTIVE GASTROENTERITIS AND COLITIS, UNSPECIFIED: ICD-10-CM

## 2024-10-16 DIAGNOSIS — K59.00 CONSTIPATION, UNSPECIFIED: ICD-10-CM

## 2024-10-16 DIAGNOSIS — K63.1 PERFORATION OF INTESTINE (NONTRAUMATIC): ICD-10-CM

## 2024-10-23 ENCOUNTER — APPOINTMENT (OUTPATIENT)
Dept: INTERNAL MEDICINE | Facility: CLINIC | Age: 39
End: 2024-10-23

## 2024-11-08 ENCOUNTER — APPOINTMENT (OUTPATIENT)
Dept: GASTROENTEROLOGY | Facility: CLINIC | Age: 39
End: 2024-11-08

## 2025-05-14 NOTE — PATIENT PROFILE ADULT - NSPROGENSOURCEINFO_GEN_A_NUR
Unable to leave  to schedule August for an upper endoscopy procedure referred by Kinza Harrison, CNP. Will send a [employer] message.    Sindi Zhu  Ph. [phone]  Pediatric GI  Senior Procedure   [facility]/ [facility]      As certified below, I, or a nurse practitioner or physician assistant working with me, had a face-to-face encounter that meets the physician face-to-face encounter requirements. patient